# Patient Record
Sex: MALE | Race: WHITE | NOT HISPANIC OR LATINO | Employment: OTHER | URBAN - METROPOLITAN AREA
[De-identification: names, ages, dates, MRNs, and addresses within clinical notes are randomized per-mention and may not be internally consistent; named-entity substitution may affect disease eponyms.]

---

## 2023-04-12 ENCOUNTER — NURSING HOME VISIT (OUTPATIENT)
Dept: POST ACUTE CARE | Facility: EXTERNAL LOCATION | Age: 68
End: 2023-04-12
Payer: COMMERCIAL

## 2023-04-12 DIAGNOSIS — R35.0 BENIGN PROSTATIC HYPERPLASIA WITH URINARY FREQUENCY: ICD-10-CM

## 2023-04-12 DIAGNOSIS — E78.2 HYPERLIPEMIA, MIXED: ICD-10-CM

## 2023-04-12 DIAGNOSIS — K21.9 GERD WITHOUT ESOPHAGITIS: ICD-10-CM

## 2023-04-12 DIAGNOSIS — G35 MULTIPLE SCLEROSIS (MULTI): Primary | ICD-10-CM

## 2023-04-12 DIAGNOSIS — F02.B11: ICD-10-CM

## 2023-04-12 DIAGNOSIS — N40.1 BENIGN PROSTATIC HYPERPLASIA WITH URINARY FREQUENCY: ICD-10-CM

## 2023-04-12 DIAGNOSIS — I10 BENIGN ESSENTIAL HYPERTENSION: ICD-10-CM

## 2023-04-12 DIAGNOSIS — G31.09: ICD-10-CM

## 2023-04-12 DIAGNOSIS — L89.152 PRESSURE ULCER OF COCCYGEAL REGION, STAGE 2 (MULTI): ICD-10-CM

## 2023-04-12 PROBLEM — F03.B11 MODERATE DEMENTIA WITH AGITATION (MULTI): Status: ACTIVE | Noted: 2023-04-12

## 2023-04-12 PROCEDURE — 99309 SBSQ NF CARE MODERATE MDM 30: CPT | Performed by: INTERNAL MEDICINE

## 2023-04-12 NOTE — LETTER
Patient: Arnoldo Pearl  : 1955    Encounter Date: 2023    Patient ID: Arnoldo Pearl is a 67 y.o. male who presents for No chief complaint on file..    Assessment/Plan    Problem List Items Addressed This Visit          Nervous    Multiple sclerosis (CMS/HCC) - Primary     PHQ Mini-Mental neuro check intermittent steroid and [facility] or clinic evaluation         Moderate dementia with agitation (CMS/HCC)     Dementia is chronic neurodegenerative disorder where there is not cure and it can bring substantial burden to pt and family and caregivers.There are choices available for treatment of dementia and careful monitoring and follow up by us can help to reduce caregiver burden and keep pt homebound along with family members Dementia does not have sure and it can bring morbidity and mortality eventually. Mental or mind exercises, mindfulness, keeping active and cheerful, proper and well balanced diet and water consumption and brisk walks can be beneficial. Lots of studies are ongoing for dementia treatment. Forgetfulness, agitation, stubbornness, depression, insomnia, wandering are not uncommon. There are organizations , societies and support groups available for dementia related disorders.             Circulatory    Benign essential hypertension     Patients BP readings reviewed and addressed, as we age our arteries turn stiffer and less elastic. Restricting salt consumption and staying physically fit with regular exercise regimen is the only way to keep our vasculature less tonic. Studies have shown that keeping ideal body wt, exercise routine about 140 to 150 minutes a week, eating variety of plant based diet and drinking plentiful water are quite helpful. Monitor BP twice or once a week at home and bring log to be reviewed by me. Uncontrolled BP has long term consequences including heart failure, myocardial infarction, accelerated atherosclerosis and kidney dysfunction. Therapy reviewed and explained.               Digestive    GERD without esophagitis       Other    Hyperlipemia, mixed    Pressure ulcer of coccygeal region, stage 2 (CMS/HCC)     Nutrition and psych evaluation         Benign prostatic hyperplasia with urinary frequency     This patient was seen for my regular monthly visit, nursing evaluations and nursing notes were reviewed, interim events are reviewed, interim concerns and messages were reviewed as we have communicated with nursing staff.  Any issues with the falls, skin care impairment, declining physical condition are reviewed and noted, diagnosis list were reviewed, list of medications were reviewed, living will related issues were reviewed, overall patient has been doing well, any declining in patient's condition or any change in patient's condition needs to be notified to physician promptly, discussed with nursing staff, if needed would communicate with family.  Patient stays confined here at the facility for long-term care, there are always concerns about long-term care related issues and concerns.  Nursing staff is trying their best to keep patient safe, all sort of measures has been taken to keep patient safe and comfortable.   Source of history: Nurse, Medical personnel, Medical record, Patient.  History limitation: None.      HPI  67-year-old patient with advanced multiple sclerosis evaluated for the tingling numbness and pressure wound advised wound care and neurology evaluation    Comorbid condition advanced multiple sclerosis with anxiety depressive dementia anemia neuromuscular disorder bladder bowel dysfunction right lower extremity and left lower extremity pressure ulcer stage II stage III venous insufficiency BPH history of alcohol and smoking gastritis      Medications  Order Order Status Revised Last Ordered    Tylenol Tablet 325 MG (Acetaminophen) Active 7/5/2022 10/28/2020    Baclofen Tablet 20 MG Active 1/28/2023 1/28/2023  This order is potentially duplicated by other orders on  the chart. Click to view details.    Ketoconazole Shampoo 2 % Active 8/9/2020 8/8/2020  This order is potentially duplicated by other orders on the chart. Click to view details.    Ketoconazole Cream 2 % Active 8/27/2022 8/27/2022    Flomax Capsule 0.4 MG (Tamsulosin HCl) Active 4/10/2023 4/10/2023    Protonix Tablet Delayed Release 40 MG (Pantoprazole Sodium) Active 3/31/2023 3/31/2023    Calcium Carbonate Tablet 1250 (500 Ca) MG Active 7/7/2022     Multivitamin-Minerals Tablet (Multiple Vitamins-Minerals) Active 7/7/2022     Aspirin EC Low Dose Tablet Delayed Release 81 MG (Aspirin) Active 12/18/2022     Lipitor Tablet 20 MG (Atorvastatin Calcium) Active 3/3/2023 3/3/2023    traZODone HCl Tablet 150 MG Active 4/4/2023 4/4/2023    Sertraline HCl Tablet 100 MG Active 2/23/2023 2/22/2023    LaMICtal Oral Tablet 25 MG (Lamotrigine) Active 3/22/2023 3/21/2023        Objective    Current Vitals     BP: 103/67 mmHg  8/7/2022 06:29  Temp:98.1 °F  10/10/2022 15:04  Pulse:67 bpm  8/7/2022 06:29  Weight:223.2 Lbs  4/1/2023 18:07  Resp:18 Breaths/min  10/6/2022 23:12  BS:  O2:97 %  10/6/2022 23:12  Pain:0  12/16/2022 12:01      PHYSICAL EXAM  General: Lethargic HEENT: PERRLA. EOMI. MMM. Nares patent bl.  Cardiovascular: Heart murmur  Respiratory: Rhonchi  GI: Soft, NT abdomen. BS present x 4.   : No CVAT BL  MSK: ROM x 4. CTLS non-tender.   Extremities: No edema. Cap refill < 2 sec.   Skin: Stage II pressure ulcer lower extremity  Neuro: Myopathy neuropathy  Psych: Depression      ROS  Constitutional: Denies fevers, chills, fatigue, weight loss/gain  HEENT: Denies HA, vision changes, hearing loss, sore throat  Cardiac: Denies CP, palpitations, edema          There is no immunization history on file for this patient.    No visits with results within 4 Month(s) from this visit.   Latest known visit with results is:   [facility] Encounter on 12/09/2019   Component Date Value Ref Range Status   • WBC 12/09/2019 11.7 (H)  4.4 - 11.3  x10E9/L Final   • nRBC 12/09/2019 0.0  0.0 - 0.0 /100 WBC Final   • RBC 12/09/2019 5.87  4.50 - 5.90 x10E12/L Final   • Hemoglobin 12/09/2019 17.0  13.5 - 17.5 g/dL Final   • Hematocrit 12/09/2019 51.6  41.0 - 52.0 % Final   • MCV 12/09/2019 88  80 - 100 fL Final   • MCHC 12/09/2019 32.9  32.0 - 36.0 g/dL Final   • Platelets 12/09/2019 317  150 - 450 x10E9/L Final   • RDW 12/09/2019 16.5 (H)  11.5 - 14.5 % Final   • Glucose 12/09/2019 108 (H)  74 - 99 mg/dL Final   • Sodium 12/09/2019 137  136 - 145 mmol/L Final   • Potassium 12/09/2019 4.2  3.5 - 5.3 mmol/L Final   • Chloride 12/09/2019 101  98 - 107 mmol/L Final   • Bicarbonate 12/09/2019 24  21 - 32 mmol/L Final   • Anion Gap 12/09/2019 16  10 - 20 mmol/L Final   • Urea Nitrogen 12/09/2019 30 (H)  6 - 23 mg/dL Final   • Creatinine 12/09/2019 1.04  0.50 - 1.30 mg/dL Final   • GLOMERULAR FILTRATION RATE-NON AFR* 12/09/2019 >60  >60 mL/min/1.73m2 Final   • GLOMERULAR FILTRATION RATE-* 12/09/2019 >60  >60 mL/min/1.73m2 Final   • Calcium 12/09/2019 9.0  8.6 - 10.3 mg/dL Final       Radiology: Reviewed imaging in powerchart.  No results found.    No family history on file.  Social History     Socioeconomic History   • Marital status: Single     Spouse name: None   • Number of children: None   • Years of education: None   • Highest education level: None   Occupational History   • None   Tobacco Use   • Smoking status: Former     Types: Cigarettes   • Smokeless tobacco: Never   Vaping Use   • Vaping status: None   Substance and Sexual Activity   • Alcohol use: Not Currently   • Drug use: Not Currently   • Sexual activity: None   Other Topics Concern   • None   Social History Narrative   • None     Social Determinants of Health     Financial Resource Strain: Not on file   Food Insecurity: Not on file   Transportation Needs: Not on file   Physical Activity: Not on file   Stress: Not on file   Social Connections: Not on file   Intimate Partner Violence: Not on file    Housing Stability: Not on file     No past medical history on file.  No past surgical history on file.  * Cannot find OR log *    Charting was completed using voice recognition technology and may include unintended errors.           Electronically Signed By: Dennys Dean MD   4/12/23  9:29 PM

## 2023-04-13 NOTE — ASSESSMENT & PLAN NOTE
Dementia is chronic neurodegenerative disorder where there is not cure and it can bring substantial burden to pt and family and caregivers.There are choices available for treatment of dementia and careful monitoring and follow up by us can help to reduce caregiver burden and keep pt homebound along with family members Dementia does not have sure and it can bring morbidity and mortality eventually. Mental or mind exercises, mindfulness, keeping active and cheerful, proper and well balanced diet and water consumption and brisk walks can be beneficial. Lots of studies are ongoing for dementia treatment. Forgetfulness, agitation, stubbornness, depression, insomnia, wandering are not uncommon. There are organizations , societies and support groups available for dementia related disorders.

## 2023-04-13 NOTE — PROGRESS NOTES
Patient ID: Arnoldo Pearl is a 67 y.o. male who presents for No chief complaint on file..    Assessment/Plan     Problem List Items Addressed This Visit          Nervous    Multiple sclerosis (CMS/HCC) - Primary     PHQ Mini-Mental neuro check intermittent steroid and [facility] or clinic evaluation         Moderate dementia with agitation (CMS/HCC)     Dementia is chronic neurodegenerative disorder where there is not cure and it can bring substantial burden to pt and family and caregivers.There are choices available for treatment of dementia and careful monitoring and follow up by us can help to reduce caregiver burden and keep pt homebound along with family members Dementia does not have sure and it can bring morbidity and mortality eventually. Mental or mind exercises, mindfulness, keeping active and cheerful, proper and well balanced diet and water consumption and brisk walks can be beneficial. Lots of studies are ongoing for dementia treatment. Forgetfulness, agitation, stubbornness, depression, insomnia, wandering are not uncommon. There are organizations , societies and support groups available for dementia related disorders.             Circulatory    Benign essential hypertension     Patients BP readings reviewed and addressed, as we age our arteries turn stiffer and less elastic. Restricting salt consumption and staying physically fit with regular exercise regimen is the only way to keep our vasculature less tonic. Studies have shown that keeping ideal body wt, exercise routine about 140 to 150 minutes a week, eating variety of plant based diet and drinking plentiful water are quite helpful. Monitor BP twice or once a week at home and bring log to be reviewed by me. Uncontrolled BP has long term consequences including heart failure, myocardial infarction, accelerated atherosclerosis and kidney dysfunction. Therapy reviewed and explained.              Digestive    GERD without esophagitis       Other     Hyperlipemia, mixed    Pressure ulcer of coccygeal region, stage 2 (CMS/HCC)     Nutrition and psych evaluation         Benign prostatic hyperplasia with urinary frequency     This patient was seen for my regular monthly visit, nursing evaluations and nursing notes were reviewed, interim events are reviewed, interim concerns and messages were reviewed as we have communicated with nursing staff.  Any issues with the falls, skin care impairment, declining physical condition are reviewed and noted, diagnosis list were reviewed, list of medications were reviewed, living will related issues were reviewed, overall patient has been doing well, any declining in patient's condition or any change in patient's condition needs to be notified to physician promptly, discussed with nursing staff, if needed would communicate with family.  Patient stays confined here at the facility for long-term care, there are always concerns about long-term care related issues and concerns.  Nursing staff is trying their best to keep patient safe, all sort of measures has been taken to keep patient safe and comfortable.   Source of history: Nurse, Medical personnel, Medical record, Patient.  History limitation: None.      HPI  67-year-old patient with advanced multiple sclerosis evaluated for the tingling numbness and pressure wound advised wound care and neurology evaluation    Comorbid condition advanced multiple sclerosis with anxiety depressive dementia anemia neuromuscular disorder bladder bowel dysfunction right lower extremity and left lower extremity pressure ulcer stage II stage III venous insufficiency BPH history of alcohol and smoking gastritis      Medications  Order Order Status Revised Last Ordered    Tylenol Tablet 325 MG (Acetaminophen) Active 7/5/2022 10/28/2020    Baclofen Tablet 20 MG Active 1/28/2023 1/28/2023  This order is potentially duplicated by other orders on the chart. Click to view details.    Ketoconazole Shampoo 2  % Active 8/9/2020 8/8/2020  This order is potentially duplicated by other orders on the chart. Click to view details.    Ketoconazole Cream 2 % Active 8/27/2022 8/27/2022    Flomax Capsule 0.4 MG (Tamsulosin HCl) Active 4/10/2023 4/10/2023    Protonix Tablet Delayed Release 40 MG (Pantoprazole Sodium) Active 3/31/2023 3/31/2023    Calcium Carbonate Tablet 1250 (500 Ca) MG Active 7/7/2022     Multivitamin-Minerals Tablet (Multiple Vitamins-Minerals) Active 7/7/2022     Aspirin EC Low Dose Tablet Delayed Release 81 MG (Aspirin) Active 12/18/2022     Lipitor Tablet 20 MG (Atorvastatin Calcium) Active 3/3/2023 3/3/2023    traZODone HCl Tablet 150 MG Active 4/4/2023 4/4/2023    Sertraline HCl Tablet 100 MG Active 2/23/2023 2/22/2023    LaMICtal Oral Tablet 25 MG (Lamotrigine) Active 3/22/2023 3/21/2023        Objective     Current Vitals     BP: 103/67 mmHg  8/7/2022 06:29  Temp:98.1 °F  10/10/2022 15:04  Pulse:67 bpm  8/7/2022 06:29  Weight:223.2 Lbs  4/1/2023 18:07  Resp:18 Breaths/min  10/6/2022 23:12  BS:  O2:97 %  10/6/2022 23:12  Pain:0  12/16/2022 12:01      PHYSICAL EXAM  General: Lethargic HEENT: PERRLA. EOMI. MMM. Nares patent bl.  Cardiovascular: Heart murmur  Respiratory: Rhonchi  GI: Soft, NT abdomen. BS present x 4.   : No CVAT BL  MSK: ROM x 4. CTLS non-tender.   Extremities: No edema. Cap refill < 2 sec.   Skin: Stage II pressure ulcer lower extremity  Neuro: Myopathy neuropathy  Psych: Depression      ROS  Constitutional: Denies fevers, chills, fatigue, weight loss/gain  HEENT: Denies HA, vision changes, hearing loss, sore throat  Cardiac: Denies CP, palpitations, edema          There is no immunization history on file for this patient.    No visits with results within 4 Month(s) from this visit.   Latest known visit with results is:   [facility] Encounter on 12/09/2019   Component Date Value Ref Range Status    WBC 12/09/2019 11.7 (H)  4.4 - 11.3 x10E9/L Final    nRBC 12/09/2019 0.0  0.0 - 0.0 /100 WBC  Final    RBC 12/09/2019 5.87  4.50 - 5.90 x10E12/L Final    Hemoglobin 12/09/2019 17.0  13.5 - 17.5 g/dL Final    Hematocrit 12/09/2019 51.6  41.0 - 52.0 % Final    MCV 12/09/2019 88  80 - 100 fL Final    MCHC 12/09/2019 32.9  32.0 - 36.0 g/dL Final    Platelets 12/09/2019 317  150 - 450 x10E9/L Final    RDW 12/09/2019 16.5 (H)  11.5 - 14.5 % Final    Glucose 12/09/2019 108 (H)  74 - 99 mg/dL Final    Sodium 12/09/2019 137  136 - 145 mmol/L Final    Potassium 12/09/2019 4.2  3.5 - 5.3 mmol/L Final    Chloride 12/09/2019 101  98 - 107 mmol/L Final    Bicarbonate 12/09/2019 24  21 - 32 mmol/L Final    Anion Gap 12/09/2019 16  10 - 20 mmol/L Final    Urea Nitrogen 12/09/2019 30 (H)  6 - 23 mg/dL Final    Creatinine 12/09/2019 1.04  0.50 - 1.30 mg/dL Final    GLOMERULAR FILTRATION RATE-NON AFR* 12/09/2019 >60  >60 mL/min/1.73m2 Final    GLOMERULAR FILTRATION RATE-* 12/09/2019 >60  >60 mL/min/1.73m2 Final    Calcium 12/09/2019 9.0  8.6 - 10.3 mg/dL Final       Radiology: Reviewed imaging in powerchart.  No results found.    No family history on file.  Social History     Socioeconomic History    Marital status: Single     Spouse name: None    Number of children: None    Years of education: None    Highest education level: None   Occupational History    None   Tobacco Use    Smoking status: Former     Types: Cigarettes    Smokeless tobacco: Never   Vaping Use    Vaping status: None   Substance and Sexual Activity    Alcohol use: Not Currently    Drug use: Not Currently    Sexual activity: None   Other Topics Concern    None   Social History Narrative    None     Social Determinants of Health     Financial Resource Strain: Not on file   Food Insecurity: Not on file   Transportation Needs: Not on file   Physical Activity: Not on file   Stress: Not on file   Social Connections: Not on file   Intimate Partner Violence: Not on file   Housing Stability: Not on file     No past medical history on file.  No past surgical  history on file.  * Cannot find OR log *    Charting was completed using voice recognition technology and may include unintended errors.

## 2023-07-08 ENCOUNTER — NURSING HOME VISIT (OUTPATIENT)
Dept: POST ACUTE CARE | Facility: EXTERNAL LOCATION | Age: 68
End: 2023-07-08
Payer: COMMERCIAL

## 2023-07-08 DIAGNOSIS — E87.5 HYPERKALEMIA: Primary | ICD-10-CM

## 2023-07-08 DIAGNOSIS — D75.839 THROMBOCYTOSIS: ICD-10-CM

## 2023-07-08 DIAGNOSIS — D50.0 IRON DEFICIENCY ANEMIA DUE TO CHRONIC BLOOD LOSS: ICD-10-CM

## 2023-07-08 DIAGNOSIS — I10 BENIGN ESSENTIAL HYPERTENSION: ICD-10-CM

## 2023-07-08 DIAGNOSIS — L89.152 PRESSURE ULCER OF COCCYGEAL REGION, STAGE 2 (MULTI): ICD-10-CM

## 2023-07-08 DIAGNOSIS — G35 MULTIPLE SCLEROSIS (MULTI): ICD-10-CM

## 2023-07-08 PROCEDURE — 99309 SBSQ NF CARE MODERATE MDM 30: CPT | Performed by: INTERNAL MEDICINE

## 2023-07-08 NOTE — LETTER
Patient: Arnoldo Pearl  : 1955    Encounter Date: 2023    Name: Arnoldo Pearl  YOB: 1955    FOLLOW UP VISIT:   Allergies: No Known Allergies   Code Status: [facility]    Special Instructions:    Diet: Regular diet, Regular texture, Thin liquids consistency  Diagnosis: MULTIPLE SCLEROSIS   SUBJECTIVE:WBC 10.7 RBCs 3.7 hemoglobin 9 and hematocrit 32 platelets 632 sodium 134 potassium 6.5 glucose 58 calcium 7.9G OT 45 prealbumin 11.4    Iron B12 folic acid supplement aspirin complex carbohydrate diet recently had a nonhealing wound given Bactrim patient is a hospice no potassium supplement repeat potassium level again    REVIEW OF SYSTEMS:   All review of systems are negative unless otherwise stated above under subjective.    LABS REVIEWED AT FACILITY:    MEDICATIONS REVIEWED AT FACILITY:     Tylenol Tablet 325 MG (Acetaminophen) Active 2022 10/28/2020    Baclofen Tablet 20 MG Active 2023  This order is potentially duplicated by other orders on the chart. Click to view details.    Ketoconazole Shampoo 2 % Active 2020  This order is potentially duplicated by other orders on the chart. Click to view details.    Ketoconazole Cream 2 % Active 2023    Flomax Capsule 0.4 MG (Tamsulosin HCl) Active 2023    Protonix Tablet Delayed Release 40 MG (Pantoprazole Sodium) Active 2023    Calcium Carbonate Tablet 1250 (500 Ca) MG Active 2022     Multivitamin-Minerals Tablet (Multiple Vitamins-Minerals) Active 2022     Aspirin EC Low Dose Tablet Delayed Release 81 MG (Aspirin) Active 2022     Lipitor Tablet 20 MG (Atorvastatin Calcium) Active 2023    traZODone HCl Tablet 150 MG Active 2023    Zoloft Oral Tablet 100 MG (Sertraline HCl) Active 2023    LaMICtal Oral Tablet 100 MG (Lamotrigine) Active 2023    Bactrim DS Oral Tablet 800-160 MG  (Sulfamethoxazole-Trimethoprim) Active 7/22/2023 7/22/2023  Living will related issues reviewed-Code status:     OBJECTIVE:G35 MULTIPLE SCLEROSIS  8/1/2019    F33.1 MAJOR DEPRESSIVE DISORDER, RECURRENT, MODERATE  8/1/2019    F29 UNSP PSYCHOSIS NOT DUE TO A SUBSTANCE OR KNOWN PHYSIOL COND  8/1/2019    R45.851 SUICIDAL IDEATIONS  8/1/2019    G47.00 INSOMNIA, UNSPECIFIED  8/1/2019    D64.9 ANEMIA, UNSPECIFIED  8/1/2019    N31.9 NEUROMUSCULAR DYSFUNCTION OF BLADDER, UNSPECIFIED  10/20/2022    I87.323 CHRONIC VENOUS HTN W INFLAMMATION OF BILATERAL LOW EXTRM  10/20/2022    I87.313 CHRONIC VENOUS HYPERTENSION W ULCER OF BILATERAL LOW EXTRM  10/20/2022    I73.9 PERIPHERAL VASCULAR DISEASE, UNSPECIFIED  10/20/2022    L97.811 NON-PRS CHR ULCER OTH PRT R LOW LEG LIMITED TO BRKDWN SKIN  10/20/2022    L97.821 NON-PRS CHR ULCER OTH PRT L LOW LEG LIMITED TO BRKDWN SKIN  10/1/2022    F03.918 UNSP DEMENTIA, UNSP SEVERITY, WITH OTHER BEHAVIORAL DISTURB  4/6/2022    I87.312 CHRONIC VENOUS HYPERTENSION W ULCER OF L LOW EXTREM  4/6/2022    I87.311 CHRONIC VENOUS HYPERTENSION W ULCER OF R LOW EXTREM  2/24/2022    I87.2 VENOUS INSUFFICIENCY (CHRONIC) (PERIPHERAL  2/1/2022    R29.3 ABNORMAL POSTURE  12/10/2021    F22 DELUSIONAL DISORDERS  9/14/2021    M62.81 MUSCLE WEAKNESS (GENERALIZED  3/1/2021    N40.0 BENIGN PROSTATIC HYPERPLASIA WITHOUT LOWER URINRY TRACT SYMP  1/1/2021    Z86.16 PERSONAL HISTORY OF COVID-19  6/16/2020    Z87.440 PERSONAL HISTORY OF URINARY (TRACT) INFECTIONS  6/16/2020    R39.81 FUNCTIONAL URINARY INCONTINENCE  12/14/2019    K21.9 GASTRO-ESOPHAGEAL REFLUX DISEASE WITHOUT ESOPHAGITIS  8/1/2019    E78.5 HYPERLIPIDEMIA, UNSPECIFIED  8/1/2019    F10.21 ALCOHOL DEPENDENCE, IN REMISSION  There were no vitals taken for this visit.  Physical Exam  Current Vitals     BP: 103/67 mmHg  8/7/2022 06:29  Temp:98.1 °F  10/10/2022 15:04  Pulse:67 bpm  8/7/2022 06:29    Weight:217 Lbs  7/3/2023 17:30  Resp:18 Breaths/min  10/6/2022  23:12  BS:  O2:97 %  10/6/2022 23:12  Pain:0  7/22/2023 07:55  General: Lethargic HEENT: PERRLA. EOMI. MMM. Nares patent bl.  Cardiovascular: Heart murmur  Respiratory: Rhonchi  GI: Soft, NT abdomen. BS present x 4.   : No CVAT BL  MSK: ROM x 4. CTLS non-tender.   Extremities: No edema. Cap refill < 2 sec.   Skin: Stage II pressure ulcer lower extremity  Neuro: Myopathy neuropathy  Psych: Depression  Assessment/Plan  Problem List Items Addressed This Visit          Cardiac and Vasculature    Benign essential hypertension     Patients BP readings reviewed and addressed, as we age our arteries turn stiffer and less elastic. Restricting salt consumption and staying physically fit with regular exercise regimen is the only way to keep our vasculature less tonic. Studies have shown that keeping ideal body wt, exercise routine about 140 to 150 minutes a week, eating variety of plant based diet and drinking plentiful water are quite helpful. Monitor BP twice or once a week at home and bring log to be reviewed by me. Uncontrolled BP has long term consequences including heart failure, myocardial infarction, accelerated atherosclerosis and kidney dysfunction. Therapy reviewed and explained.              Genitourinary and Reproductive    Hyperkalemia - Primary     Repeat potassium calcium magnesium given albuterol insulin and Lokelma            Hematology and Neoplasia    Iron deficiency anemia due to chronic blood loss    Thrombocytosis (CMS/HCC)     Repeat CBC with differential  Hydration aspirin            Musculoskeletal and Injuries    Pressure ulcer of coccygeal region, stage 2 (CMS/HCC)       Neuro    Multiple sclerosis (CMS/HCC)     Discussed with hospice symptom management          This patient was seen for my regular monthly visit, nursing evaluations and nursing notes were reviewed, interim events are reviewed, interim concerns and messages were reviewed as we have communicated with nursing staff.  Any issues with  the falls, skin care impairment, declining physical condition are reviewed and noted, diagnosis list were reviewed, list of medications were reviewed, living will related issues were reviewed, overall patient has been doing well, any declining in patient's condition or any change in patient's condition needs to be notified to physician promptly, discussed with nursing staff, if needed would communicate with family.  Patient stays confined here at the facility for long-term care, there are always concerns about long-term care related issues and concerns.  Nursing staff is trying their best to keep patient safe, all sort of measures has been taken to keep patient safe and comfortable.   Skin integrity:  Nursing to monitor skin integrity as patient is at risk for pressure injuries.  Wound care per nursing    See Facility notes for measurements/assessments  Turn and reposition Q 2 hours or more  Air mattress and when up in chair cushion reducing device  Dietician to evaluate and recommend:  Nutritional supplement:  Please monitor skin integrity and other pressure areas  Referral to wound clinic if appropriate:    PLAN:  Pt has been seen for follow up visit.  The patient is here at facility for PT/OT/ST to maximize strength, function, endurance and safety.  The patient is participating in therapy. Arnoldo Pearl is making progress to the best of his/her ability.   Please see PT/OT/ST notes in the facility for detailed information regarding progression of patients progress.   Recent nursing evaluation and notes were reviewed.   Overall, patient is stable despite his/her chronic conditions.    #  Any decline or change in condition needs to be communicated with the physician or myself.    Discussion with nursing staff regarding ongoing care and management.  If needed, would communicate with family who are not present at this time.   There are no concerns at this time.  We will continue with the medications noted above.    We  will continue to follow the patient here at the facility.    Discharge planning:   Discharge Home when goals are met.   Follow up with PCP in 1-2 weeks after discharge from facility.   HHC to follow after discharge for continued PT/OT and Nursing.    *Please note that nursing facility, outside laboratory agency, and  [facility] do not interface.     Completion of the note was done through Dragon voice recognition technology and may include   unintended or grammatical errors which may not have been recognized when finalizing the note.     Time:    Dennys Dean MD         Electronically Signed By: Dennys Dean MD   7/22/23  4:23 PM

## 2023-07-22 PROBLEM — D75.839 THROMBOCYTOSIS: Status: ACTIVE | Noted: 2023-07-22

## 2023-07-22 PROBLEM — E87.5 HYPERKALEMIA: Status: ACTIVE | Noted: 2023-07-22

## 2023-07-22 PROBLEM — D50.0 IRON DEFICIENCY ANEMIA DUE TO CHRONIC BLOOD LOSS: Status: ACTIVE | Noted: 2023-07-22

## 2023-07-22 NOTE — PROGRESS NOTES
Name: Arnoldo Pearl  YOB: 1955    FOLLOW UP VISIT:   Allergies: No Known Allergies   Code Status: [facility]    Special Instructions:    Diet: Regular diet, Regular texture, Thin liquids consistency  Diagnosis: MULTIPLE SCLEROSIS   SUBJECTIVE:WBC 10.7 RBCs 3.7 hemoglobin 9 and hematocrit 32 platelets 632 sodium 134 potassium 6.5 glucose 58 calcium 7.9G OT 45 prealbumin 11.4    Iron B12 folic acid supplement aspirin complex carbohydrate diet recently had a nonhealing wound given Bactrim patient is a hospice no potassium supplement repeat potassium level again    REVIEW OF SYSTEMS:   All review of systems are negative unless otherwise stated above under subjective.    LABS REVIEWED AT FACILITY:    MEDICATIONS REVIEWED AT FACILITY:     Tylenol Tablet 325 MG (Acetaminophen) Active 7/5/2022 10/28/2020    Baclofen Tablet 20 MG Active 6/6/2023 6/6/2023  This order is potentially duplicated by other orders on the chart. Click to view details.    Ketoconazole Shampoo 2 % Active 8/9/2020 8/8/2020  This order is potentially duplicated by other orders on the chart. Click to view details.    Ketoconazole Cream 2 % Active 6/6/2023 6/6/2023    Flomax Capsule 0.4 MG (Tamsulosin HCl) Active 6/14/2023 6/14/2023    Protonix Tablet Delayed Release 40 MG (Pantoprazole Sodium) Active 6/30/2023 6/30/2023    Calcium Carbonate Tablet 1250 (500 Ca) MG Active 7/7/2022     Multivitamin-Minerals Tablet (Multiple Vitamins-Minerals) Active 7/7/2022     Aspirin EC Low Dose Tablet Delayed Release 81 MG (Aspirin) Active 12/18/2022     Lipitor Tablet 20 MG (Atorvastatin Calcium) Active 6/17/2023 6/17/2023    traZODone HCl Tablet 150 MG Active 6/29/2023 6/29/2023    Zoloft Oral Tablet 100 MG (Sertraline HCl) Active 6/18/2023 6/18/2023    LaMICtal Oral Tablet 100 MG (Lamotrigine) Active 7/5/2023 7/4/2023    Bactrim DS Oral Tablet 800-160 MG (Sulfamethoxazole-Trimethoprim) Active 7/22/2023 7/22/2023  Living will related issues reviewed-Code  status:     OBJECTIVE:G35 MULTIPLE SCLEROSIS  8/1/2019    F33.1 MAJOR DEPRESSIVE DISORDER, RECURRENT, MODERATE  8/1/2019    F29 UNSP PSYCHOSIS NOT DUE TO A SUBSTANCE OR KNOWN PHYSIOL COND  8/1/2019    R45.851 SUICIDAL IDEATIONS  8/1/2019    G47.00 INSOMNIA, UNSPECIFIED  8/1/2019    D64.9 ANEMIA, UNSPECIFIED  8/1/2019    N31.9 NEUROMUSCULAR DYSFUNCTION OF BLADDER, UNSPECIFIED  10/20/2022    I87.323 CHRONIC VENOUS HTN W INFLAMMATION OF BILATERAL LOW EXTRM  10/20/2022    I87.313 CHRONIC VENOUS HYPERTENSION W ULCER OF BILATERAL LOW EXTRM  10/20/2022    I73.9 PERIPHERAL VASCULAR DISEASE, UNSPECIFIED  10/20/2022    L97.811 NON-PRS CHR ULCER OTH PRT R LOW LEG LIMITED TO BRKDWN SKIN  10/20/2022    L97.821 NON-PRS CHR ULCER OTH PRT L LOW LEG LIMITED TO BRKDWN SKIN  10/1/2022    F03.918 UNSP DEMENTIA, UNSP SEVERITY, WITH OTHER BEHAVIORAL DISTURB  4/6/2022    I87.312 CHRONIC VENOUS HYPERTENSION W ULCER OF L LOW EXTREM  4/6/2022    I87.311 CHRONIC VENOUS HYPERTENSION W ULCER OF R LOW EXTREM  2/24/2022    I87.2 VENOUS INSUFFICIENCY (CHRONIC) (PERIPHERAL  2/1/2022    R29.3 ABNORMAL POSTURE  12/10/2021    F22 DELUSIONAL DISORDERS  9/14/2021    M62.81 MUSCLE WEAKNESS (GENERALIZED  3/1/2021    N40.0 BENIGN PROSTATIC HYPERPLASIA WITHOUT LOWER URINRY TRACT SYMP  1/1/2021    Z86.16 PERSONAL HISTORY OF COVID-19  6/16/2020    Z87.440 PERSONAL HISTORY OF URINARY (TRACT) INFECTIONS  6/16/2020    R39.81 FUNCTIONAL URINARY INCONTINENCE  12/14/2019    K21.9 GASTRO-ESOPHAGEAL REFLUX DISEASE WITHOUT ESOPHAGITIS  8/1/2019    E78.5 HYPERLIPIDEMIA, UNSPECIFIED  8/1/2019    F10.21 ALCOHOL DEPENDENCE, IN REMISSION  There were no vitals taken for this visit.  Physical Exam  Current Vitals     BP: 103/67 mmHg  8/7/2022 06:29  Temp:98.1 °F  10/10/2022 15:04  Pulse:67 bpm  8/7/2022 06:29    Weight:217 Lbs  7/3/2023 17:30  Resp:18 Breaths/min  10/6/2022 23:12  BS:  O2:97 %  10/6/2022 23:12  Pain:0  7/22/2023 07:55  General: Lethargic HEENT: PERRLA. EOMI.  MMM. Nares patent bl.  Cardiovascular: Heart murmur  Respiratory: Rhonchi  GI: Soft, NT abdomen. BS present x 4.   : No CVAT BL  MSK: ROM x 4. CTLS non-tender.   Extremities: No edema. Cap refill < 2 sec.   Skin: Stage II pressure ulcer lower extremity  Neuro: Myopathy neuropathy  Psych: Depression  Assessment/Plan   Problem List Items Addressed This Visit          Cardiac and Vasculature    Benign essential hypertension     Patients BP readings reviewed and addressed, as we age our arteries turn stiffer and less elastic. Restricting salt consumption and staying physically fit with regular exercise regimen is the only way to keep our vasculature less tonic. Studies have shown that keeping ideal body wt, exercise routine about 140 to 150 minutes a week, eating variety of plant based diet and drinking plentiful water are quite helpful. Monitor BP twice or once a week at home and bring log to be reviewed by me. Uncontrolled BP has long term consequences including heart failure, myocardial infarction, accelerated atherosclerosis and kidney dysfunction. Therapy reviewed and explained.              Genitourinary and Reproductive    Hyperkalemia - Primary     Repeat potassium calcium magnesium given albuterol insulin and Lokelma            Hematology and Neoplasia    Iron deficiency anemia due to chronic blood loss    Thrombocytosis (CMS/HCC)     Repeat CBC with differential  Hydration aspirin            Musculoskeletal and Injuries    Pressure ulcer of coccygeal region, stage 2 (CMS/HCC)       Neuro    Multiple sclerosis (CMS/HCC)     Discussed with hospice symptom management          This patient was seen for my regular monthly visit, nursing evaluations and nursing notes were reviewed, interim events are reviewed, interim concerns and messages were reviewed as we have communicated with nursing staff.  Any issues with the falls, skin care impairment, declining physical condition are reviewed and noted, diagnosis list  were reviewed, list of medications were reviewed, living will related issues were reviewed, overall patient has been doing well, any declining in patient's condition or any change in patient's condition needs to be notified to physician promptly, discussed with nursing staff, if needed would communicate with family.  Patient stays confined here at the facility for long-term care, there are always concerns about long-term care related issues and concerns.  Nursing staff is trying their best to keep patient safe, all sort of measures has been taken to keep patient safe and comfortable.   Skin integrity:  Nursing to monitor skin integrity as patient is at risk for pressure injuries.  Wound care per nursing    See Facility notes for measurements/assessments  Turn and reposition Q 2 hours or more  Air mattress and when up in chair cushion reducing device  Dietician to evaluate and recommend:  Nutritional supplement:  Please monitor skin integrity and other pressure areas  Referral to wound clinic if appropriate:    PLAN:  Pt has been seen for follow up visit.  The patient is here at facility for PT/OT/ST to maximize strength, function, endurance and safety.  The patient is participating in therapy. Arnoldo Pearl is making progress to the best of his/her ability.   Please see PT/OT/ST notes in the facility for detailed information regarding progression of patients progress.   Recent nursing evaluation and notes were reviewed.   Overall, patient is stable despite his/her chronic conditions.    #  Any decline or change in condition needs to be communicated with the physician or myself.    Discussion with nursing staff regarding ongoing care and management.  If needed, would communicate with family who are not present at this time.   There are no concerns at this time.  We will continue with the medications noted above.    We will continue to follow the patient here at the facility.    Discharge planning:   Discharge Home when  goals are met.   Follow up with PCP in 1-2 weeks after discharge from facility.   HHC to follow after discharge for continued PT/OT and Nursing.    *Please note that nursing facility, outside laboratory agency, and  [facility] do not interface.     Completion of the note was done through Dragon voice recognition technology and may include   unintended or grammatical errors which may not have been recognized when finalizing the note.     Time:    Dennys Dean MD

## 2023-08-23 ENCOUNTER — NURSING HOME VISIT (OUTPATIENT)
Dept: POST ACUTE CARE | Facility: EXTERNAL LOCATION | Age: 68
End: 2023-08-23
Payer: COMMERCIAL

## 2023-08-23 DIAGNOSIS — L89.152 PRESSURE ULCER OF COCCYGEAL REGION, STAGE 2 (MULTI): ICD-10-CM

## 2023-08-23 DIAGNOSIS — D50.0 IRON DEFICIENCY ANEMIA DUE TO CHRONIC BLOOD LOSS: ICD-10-CM

## 2023-08-23 DIAGNOSIS — N40.1 BENIGN PROSTATIC HYPERPLASIA WITH URINARY FREQUENCY: ICD-10-CM

## 2023-08-23 DIAGNOSIS — F10.27: ICD-10-CM

## 2023-08-23 DIAGNOSIS — K21.9 GERD WITHOUT ESOPHAGITIS: ICD-10-CM

## 2023-08-23 DIAGNOSIS — E78.2 HYPERLIPEMIA, MIXED: ICD-10-CM

## 2023-08-23 DIAGNOSIS — R35.0 BENIGN PROSTATIC HYPERPLASIA WITH URINARY FREQUENCY: ICD-10-CM

## 2023-08-23 DIAGNOSIS — I10 BENIGN ESSENTIAL HYPERTENSION: ICD-10-CM

## 2023-08-23 DIAGNOSIS — G35 MULTIPLE SCLEROSIS (MULTI): Primary | ICD-10-CM

## 2023-08-23 PROBLEM — E87.5 HYPERKALEMIA: Status: RESOLVED | Noted: 2023-07-22 | Resolved: 2023-08-23

## 2023-08-23 PROCEDURE — 99309 SBSQ NF CARE MODERATE MDM 30: CPT | Performed by: INTERNAL MEDICINE

## 2023-08-23 NOTE — PROGRESS NOTES
Name: Arnoldo Pearl  YOB: 1955    FOLLOW UP VISIT:   Allergies: No Known Allergies   Code Status: [facility]    Special Instructions:    Diet: Regular diet, Regular texture, Thin liquids consistency  Diagnosis: MULTIPLE SCLEROSIS   SUBJECTIVE:WBC   67-year-old patient of obesity multiple sclerosis anemia anxiety depression complaining arthralgia myalgia fatigue tired weakness tingling numbness nonhealing wound lower extremity  Anemia hemoglobin 11.9-39.6 urinalysis stool occult blood iron level right to left lower leg wound advised wound care evaluation anxiety depression iron B12 folic acid Zoloft  10.7 RBCs 3.7 hemoglobin 9 and hematocrit 32 platelets 632 sodium 134 potassium 6.5 glucose 58 calcium 7.9G OT 45 prealbumin 11.4    Iron B12 folic acid supplement aspirin complex carbohydrate diet recently had a nonhealing wound given Bactrim patient is a hospice no potassium supplement repeat potassium level again    REVIEW OF SYSTEMS:   All review of systems are negative unless otherwise stated above under subjective.    LABS REVIEWED AT FACILITY:    MEDICATIONS REVIEWED AT FACILITY:     Tylenol Tablet 325 MG (Acetaminophen) Active 7/5/2022 10/28/2020    Baclofen Tablet 20 MG Active 6/6/2023 6/6/2023  This order is potentially duplicated by other orders on the chart. Click to view details.    Ketoconazole Shampoo 2 % Active 8/9/2020 8/8/2020  This order is potentially duplicated by other orders on the chart. Click to view details.    Ketoconazole Cream 2 % Active 6/6/2023 6/6/2023    Flomax Capsule 0.4 MG (Tamsulosin HCl) Active 6/14/2023 6/14/2023    Protonix Tablet Delayed Release 40 MG (Pantoprazole Sodium) Active 6/30/2023 6/30/2023    Calcium Carbonate Tablet 1250 (500 Ca) MG Active 7/7/2022     Multivitamin-Minerals Tablet (Multiple Vitamins-Minerals) Active 7/7/2022     Aspirin EC Low Dose Tablet Delayed Release 81 MG (Aspirin) Active 12/18/2022     Lipitor Tablet 20 MG (Atorvastatin  Calcium) Active 6/17/2023 6/17/2023    traZODone HCl Tablet 150 MG Active 6/29/2023 6/29/2023    Zoloft Oral Tablet 100 MG (Sertraline HCl) Active 6/18/2023 6/18/2023    LaMICtal Oral Tablet 100 MG (Lamotrigine) Active 7/5/2023 7/4/2023    Bactrim DS Oral Tablet 800-160 MG (Sulfamethoxazole-Trimethoprim) Active 7/22/2023 7/22/2023  Living will related issues reviewed-Code status:     OBJECTIVE:G35 MULTIPLE SCLEROSIS  8/1/2019    F33.1 MAJOR DEPRESSIVE DISORDER, RECURRENT, MODERATE  8/1/2019    F29 UNSP PSYCHOSIS NOT DUE TO A SUBSTANCE OR KNOWN PHYSIOL COND  8/1/2019    R45.851 SUICIDAL IDEATIONS  8/1/2019    G47.00 INSOMNIA, UNSPECIFIED  8/1/2019    D64.9 ANEMIA, UNSPECIFIED  8/1/2019    N31.9 NEUROMUSCULAR DYSFUNCTION OF BLADDER, UNSPECIFIED  10/20/2022    I87.323 CHRONIC VENOUS HTN W INFLAMMATION OF BILATERAL LOW EXTRM  10/20/2022    I87.313 CHRONIC VENOUS HYPERTENSION W ULCER OF BILATERAL LOW EXTRM  10/20/2022    I73.9 PERIPHERAL VASCULAR DISEASE, UNSPECIFIED  10/20/2022    L97.811 NON-PRS CHR ULCER OTH PRT R LOW LEG LIMITED TO BRKDWN SKIN  10/20/2022    L97.821 NON-PRS CHR ULCER OTH PRT L LOW LEG LIMITED TO BRKDWN SKIN  10/1/2022    F03.918 UNSP DEMENTIA, UNSP SEVERITY, WITH OTHER BEHAVIORAL DISTURB  4/6/2022    I87.312 CHRONIC VENOUS HYPERTENSION W ULCER OF L LOW EXTREM  4/6/2022    I87.311 CHRONIC VENOUS HYPERTENSION W ULCER OF R LOW EXTREM  2/24/2022    I87.2 VENOUS INSUFFICIENCY (CHRONIC) (PERIPHERAL  2/1/2022    R29.3 ABNORMAL POSTURE  12/10/2021    F22 DELUSIONAL DISORDERS  9/14/2021    M62.81 MUSCLE WEAKNESS (GENERALIZED  3/1/2021    N40.0 BENIGN PROSTATIC HYPERPLASIA WITHOUT LOWER URINRY TRACT SYMP  1/1/2021    Z86.16 PERSONAL HISTORY OF COVID-19  6/16/2020    Z87.440 PERSONAL HISTORY OF URINARY (TRACT) INFECTIONS  6/16/2020    R39.81 FUNCTIONAL URINARY INCONTINENCE  12/14/2019    K21.9 GASTRO-ESOPHAGEAL REFLUX DISEASE WITHOUT ESOPHAGITIS  8/1/2019    E78.5 HYPERLIPIDEMIA, UNSPECIFIED  8/1/2019     F10.21 ALCOHOL DEPENDENCE, IN REMISSION  There were no vitals taken for this visit.  Physical Exam    Current Vitals     BP: 103/67 mmHg  8/7/2022 06:29  Temp:98.1 °F  10/10/2022 15:04  Pulse:67 bpm  8/7/2022 06:29    Weight:217 Lbs  7/3/2023 17:30  Resp:18 Breaths/min  10/6/2022 23:12  BS:  O2:97 %  10/6/2022 23:12  Pain:0  8/23/2023 10:09  Current Vitals     BP: 103/67 mmHg  8/7/2022 06:29  Temp:98.1 °F  10/10/2022 15:04  Pulse:67 bpm  8/7/2022 06:29      General: Lethargic HEENT: PERRLA. EOMI. MMM. Nares patent bl.  Cardiovascular: Heart murmur  Respiratory: Rhonchi  GI: Soft, NT abdomen. BS present x 4.   : No CVAT BL  MSK: ROM x 4. CTLS non-tender.   Extremities: No edema. Cap refill < 2 sec.   Skin: Stage II pressure ulcer lower extremity  Neuro: Myopathy neuropathy  Psych: Depression  Assessment/Plan   Problem List Items Addressed This Visit       Multiple sclerosis (CMS/HCC) - Primary    GERD without esophagitis    Moderate dementia with agitation (CMS/HCC)     Dementia is chronic neurodegenerative disorder where there is not cure and it can bring substantial burden to pt and family and caregivers.There are choices available for treatment of dementia and careful monitoring and follow up by us can help to reduce caregiver burden and keep pt homebound along with family members Dementia does not have sure and it can bring morbidity and mortality eventually. Mental or mind exercises, mindfulness, keeping active and cheerful, proper and well balanced diet and water consumption and brisk walks can be beneficial. Lots of studies are ongoing for dementia treatment. Forgetfulness, agitation, stubbornness, depression, insomnia, wandering are not uncommon. There are organizations , societies and support groups available for dementia related disorders.          Benign essential hypertension     Patients BP readings reviewed and addressed, as we age our arteries turn stiffer and less elastic. Restricting salt  consumption and staying physically fit with regular exercise regimen is the only way to keep our vasculature less tonic. Studies have shown that keeping ideal body wt, exercise routine about 140 to 150 minutes a week, eating variety of plant based diet and drinking plentiful water are quite helpful. Monitor BP twice or once a week at home and bring log to be reviewed by me. Uncontrolled BP has long term consequences including heart failure, myocardial infarction, accelerated atherosclerosis and kidney dysfunction. Therapy reviewed and explained.           Hyperlipemia, mixed    Pressure ulcer of coccygeal region, stage 2 (CMS/HCC)    Benign prostatic hyperplasia with urinary frequency    Iron deficiency anemia due to chronic blood loss   This patient was seen for my regular monthly visit, nursing evaluations and nursing notes were reviewed, interim events are reviewed, interim concerns and messages were reviewed as we have communicated with nursing staff.  Any issues with the falls, skin care impairment, declining physical condition are reviewed and noted, diagnosis list were reviewed, list of medications were reviewed, living will related issues were reviewed, overall patient has been doing well, any declining in patient's condition or any change in patient's condition needs to be notified to physician promptly, discussed with nursing staff, if needed would communicate with family.  Patient stays confined here at the facility for long-term care, there are always concerns about long-term care related issues and concerns.  Nursing staff is trying their best to keep patient safe, all sort of measures has been taken to keep patient safe and comfortable.   Skin integrity:  Nursing to monitor skin integrity as patient is at risk for pressure injuries.  Wound care per nursing    See Facility notes for measurements/assessments  Turn and reposition Q 2 hours or more  Air mattress and when up in chair cushion reducing  device  Dietician to evaluate and recommend:  Nutritional supplement:  Please monitor skin integrity and other pressure areas  Referral to wound clinic if appropriate:    PLAN:  Pt has been seen for follow up visit.  The patient is here at facility for PT/OT/ST to maximize strength, function, endurance and safety.  The patient is participating in therapy. Arnoldo Pearl is making progress to the best of his/her ability.   Please see PT/OT/ST notes in the facility for detailed information regarding progression of patients progress.   Recent nursing evaluation and notes were reviewed.   Overall, patient is stable despite his/her chronic conditions.    #  Any decline or change in condition needs to be communicated with the physician or myself.    Discussion with nursing staff regarding ongoing care and management.  If needed, would communicate with family who are not present at this time.   There are no concerns at this time.  We will continue with the medications noted above.    We will continue to follow the patient here at the facility.    Discharge planning:   Discharge Home when goals are met.   Follow up with PCP in 1-2 weeks after discharge from facility.   [facility] to follow after discharge for continued PT/OT and Nursing.    *Please note that nursing facility, outside laboratory agency, and  [facility] do not interface.     Completion of the note was done through Dragon voice recognition technology and may include   unintended or grammatical errors which may not have been recognized when finalizing the note.     Time:    Dennys Dean MD

## 2023-08-23 NOTE — LETTER
Patient: Arnoldo Pearl  : 1955    Encounter Date: 2023    Name: Arnoldo Pearl  YOB: 1955    FOLLOW UP VISIT:   Allergies: No Known Allergies   [facility]    Special Instructions:    Diet: Regular diet, Regular texture, Thin liquids consistency  Diagnosis: MULTIPLE SCLEROSIS   SUBJECTIVE:WBC   67-year-old patient of obesity multiple sclerosis anemia anxiety depression complaining arthralgia myalgia fatigue tired weakness tingling numbness nonhealing wound lower extremity  Anemia hemoglobin 11.9-39.6 urinalysis stool occult blood iron level right to left lower leg wound advised wound care evaluation anxiety depression iron B12 folic acid Zoloft  10.7 RBCs 3.7 hemoglobin 9 and hematocrit 32 platelets 632 sodium 134 potassium 6.5 glucose 58 calcium 7.9G OT 45 prealbumin 11.4    Iron B12 folic acid supplement aspirin complex carbohydrate diet recently had a nonhealing wound given Bactrim patient is a hospice no potassium supplement repeat potassium level again    REVIEW OF SYSTEMS:   All review of systems are negative unless otherwise stated above under subjective.    LABS REVIEWED AT FACILITY:    MEDICATIONS REVIEWED AT FACILITY:     Tylenol Tablet 325 MG (Acetaminophen) Active 2022 10/28/2020    Baclofen Tablet 20 MG Active 2023  This order is potentially duplicated by other orders on the chart. Click to view details.    Ketoconazole Shampoo 2 % Active 2020  This order is potentially duplicated by other orders on the chart. Click to view details.    Ketoconazole Cream 2 % Active 2023    Flomax Capsule 0.4 MG (Tamsulosin HCl) Active 2023    Protonix Tablet Delayed Release 40 MG (Pantoprazole Sodium) Active 2023    Calcium Carbonate Tablet 1250 (500 Ca) MG Active 2022     Multivitamin-Minerals Tablet (Multiple Vitamins-Minerals) Active 2022     Aspirin EC Low Dose Tablet Delayed Release 81 MG  (Aspirin) Active 12/18/2022     Lipitor Tablet 20 MG (Atorvastatin Calcium) Active 6/17/2023 6/17/2023    traZODone HCl Tablet 150 MG Active 6/29/2023 6/29/2023    Zoloft Oral Tablet 100 MG (Sertraline HCl) Active 6/18/2023 6/18/2023    LaMICtal Oral Tablet 100 MG (Lamotrigine) Active 7/5/2023 7/4/2023    Bactrim DS Oral Tablet 800-160 MG (Sulfamethoxazole-Trimethoprim) Active 7/22/2023 7/22/2023  Living will related issues reviewed-Code status:     OBJECTIVE:G35 MULTIPLE SCLEROSIS  8/1/2019    F33.1 MAJOR DEPRESSIVE DISORDER, RECURRENT, MODERATE  8/1/2019    F29 UNSP PSYCHOSIS NOT DUE TO A SUBSTANCE OR KNOWN PHYSIOL COND  8/1/2019    R45.851 SUICIDAL IDEATIONS  8/1/2019    G47.00 INSOMNIA, UNSPECIFIED  8/1/2019    D64.9 ANEMIA, UNSPECIFIED  8/1/2019    N31.9 NEUROMUSCULAR DYSFUNCTION OF BLADDER, UNSPECIFIED  10/20/2022    I87.323 CHRONIC VENOUS HTN W INFLAMMATION OF BILATERAL LOW EXTRM  10/20/2022    I87.313 CHRONIC VENOUS HYPERTENSION W ULCER OF BILATERAL LOW EXTRM  10/20/2022    I73.9 PERIPHERAL VASCULAR DISEASE, UNSPECIFIED  10/20/2022    L97.811 NON-PRS CHR ULCER OTH PRT R LOW LEG LIMITED TO BRKDWN SKIN  10/20/2022    L97.821 NON-PRS CHR ULCER OTH PRT L LOW LEG LIMITED TO BRKDWN SKIN  10/1/2022    F03.918 UNSP DEMENTIA, UNSP SEVERITY, WITH OTHER BEHAVIORAL DISTURB  4/6/2022    I87.312 CHRONIC VENOUS HYPERTENSION W ULCER OF L LOW EXTREM  4/6/2022    I87.311 CHRONIC VENOUS HYPERTENSION W ULCER OF R LOW EXTREM  2/24/2022    I87.2 VENOUS INSUFFICIENCY (CHRONIC) (PERIPHERAL  2/1/2022    R29.3 ABNORMAL POSTURE  12/10/2021    F22 DELUSIONAL DISORDERS  9/14/2021    M62.81 MUSCLE WEAKNESS (GENERALIZED  3/1/2021    N40.0 BENIGN PROSTATIC HYPERPLASIA WITHOUT LOWER URINRY TRACT SYMP  1/1/2021    Z86.16 PERSONAL HISTORY OF COVID-19  6/16/2020    Z87.440 PERSONAL HISTORY OF URINARY (TRACT) INFECTIONS  6/16/2020    R39.81 FUNCTIONAL URINARY INCONTINENCE  12/14/2019    K21.9 GASTRO-ESOPHAGEAL REFLUX DISEASE WITHOUT  ESOPHAGITIS  8/1/2019    E78.5 HYPERLIPIDEMIA, UNSPECIFIED  8/1/2019    F10.21 ALCOHOL DEPENDENCE, IN REMISSION  There were no vitals taken for this visit.  Physical Exam    Current Vitals     BP: 103/67 mmHg  8/7/2022 06:29  Temp:98.1 °F  10/10/2022 15:04  Pulse:67 bpm  8/7/2022 06:29    Weight:217 Lbs  7/3/2023 17:30  Resp:18 Breaths/min  10/6/2022 23:12  BS:  O2:97 %  10/6/2022 23:12  Pain:0  8/23/2023 10:09  Current Vitals     BP: 103/67 mmHg  8/7/2022 06:29  Temp:98.1 °F  10/10/2022 15:04  Pulse:67 bpm  8/7/2022 06:29      General: Lethargic HEENT: PERRLA. EOMI. MMM. Nares patent bl.  Cardiovascular: Heart murmur  Respiratory: Rhonchi  GI: Soft, NT abdomen. BS present x 4.   : No CVAT BL  MSK: ROM x 4. CTLS non-tender.   Extremities: No edema. Cap refill < 2 sec.   Skin: Stage II pressure ulcer lower extremity  Neuro: Myopathy neuropathy  Psych: Depression  Assessment/Plan  Problem List Items Addressed This Visit       Multiple sclerosis (CMS/HCC) - Primary    GERD without esophagitis    Moderate dementia with agitation (CMS/HCC)     Dementia is chronic neurodegenerative disorder where there is not cure and it can bring substantial burden to pt and family and caregivers.There are choices available for treatment of dementia and careful monitoring and follow up by us can help to reduce caregiver burden and keep pt homebound along with family members Dementia does not have sure and it can bring morbidity and mortality eventually. Mental or mind exercises, mindfulness, keeping active and cheerful, proper and well balanced diet and water consumption and brisk walks can be beneficial. Lots of studies are ongoing for dementia treatment. Forgetfulness, agitation, stubbornness, depression, insomnia, wandering are not uncommon. There are organizations , societies and support groups available for dementia related disorders.          Benign essential hypertension     Patients BP readings reviewed and addressed, as we age  our arteries turn stiffer and less elastic. Restricting salt consumption and staying physically fit with regular exercise regimen is the only way to keep our vasculature less tonic. Studies have shown that keeping ideal body wt, exercise routine about 140 to 150 minutes a week, eating variety of plant based diet and drinking plentiful water are quite helpful. Monitor BP twice or once a week at home and bring log to be reviewed by me. Uncontrolled BP has long term consequences including heart failure, myocardial infarction, accelerated atherosclerosis and kidney dysfunction. Therapy reviewed and explained.           Hyperlipemia, mixed    Pressure ulcer of coccygeal region, stage 2 (CMS/HCC)    Benign prostatic hyperplasia with urinary frequency    Iron deficiency anemia due to chronic blood loss   This patient was seen for my regular monthly visit, nursing evaluations and nursing notes were reviewed, interim events are reviewed, interim concerns and messages were reviewed as we have communicated with nursing staff.  Any issues with the falls, skin care impairment, declining physical condition are reviewed and noted, diagnosis list were reviewed, list of medications were reviewed, living will related issues were reviewed, overall patient has been doing well, any declining in patient's condition or any change in patient's condition needs to be notified to physician promptly, discussed with nursing staff, if needed would communicate with family.  Patient stays confined here at the facility for long-term care, there are always concerns about long-term care related issues and concerns.  Nursing staff is trying their best to keep patient safe, all sort of measures has been taken to keep patient safe and comfortable.   Skin integrity:  Nursing to monitor skin integrity as patient is at risk for pressure injuries.  Wound care per nursing    See Facility notes for measurements/assessments  Turn and reposition Q 2 hours or  more  Air mattress and when up in chair cushion reducing device  Dietician to evaluate and recommend:  Nutritional supplement:  Please monitor skin integrity and other pressure areas  Referral to wound clinic if appropriate:    PLAN:  Pt has been seen for follow up visit.  The patient is here at facility for PT/OT/ST to maximize strength, function, endurance and safety.  The patient is participating in therapy. Arnoldo Pearl is making progress to the best of his/her ability.   Please see PT/OT/ST notes in the facility for detailed information regarding progression of patients progress.   Recent nursing evaluation and notes were reviewed.   Overall, patient is stable despite his/her chronic conditions.    #  Any decline or change in condition needs to be communicated with the physician or myself.    Discussion with nursing staff regarding ongoing care and management.  If needed, would communicate with family who are not present at this time.   There are no concerns at this time.  We will continue with the medications noted above.    We will continue to follow the patient here at the facility.    Discharge planning:   Discharge Home when goals are met.   Follow up with PCP in 1-2 weeks after discharge from facility.   [facility] to follow after discharge for continued PT/OT and Nursing.    *Please note that nursing facility, outside laboratory agency, and  [facility] do not interface.     Completion of the note was done through Dragon voice recognition technology and may include   unintended or grammatical errors which may not have been recognized when finalizing the note.     Time:    Dennys Dean MD         Electronically Signed By: Dennys Dean MD   8/23/23  5:21 PM

## 2023-09-30 ENCOUNTER — NURSING HOME VISIT (OUTPATIENT)
Dept: POST ACUTE CARE | Facility: EXTERNAL LOCATION | Age: 68
End: 2023-09-30
Payer: COMMERCIAL

## 2023-09-30 DIAGNOSIS — G35 MULTIPLE SCLEROSIS (MULTI): Primary | ICD-10-CM

## 2023-09-30 DIAGNOSIS — L89.152 PRESSURE ULCER OF COCCYGEAL REGION, STAGE 2 (MULTI): ICD-10-CM

## 2023-09-30 DIAGNOSIS — I10 BENIGN ESSENTIAL HYPERTENSION: ICD-10-CM

## 2023-09-30 DIAGNOSIS — K21.9 GERD WITHOUT ESOPHAGITIS: ICD-10-CM

## 2023-09-30 DIAGNOSIS — D75.839 THROMBOCYTOSIS: ICD-10-CM

## 2023-09-30 DIAGNOSIS — E78.2 HYPERLIPEMIA, MIXED: ICD-10-CM

## 2023-09-30 PROCEDURE — 99308 SBSQ NF CARE LOW MDM 20: CPT | Performed by: INTERNAL MEDICINE

## 2023-09-30 NOTE — LETTER
Patient: Arnoldo Pearl  : 1955    Encounter Date: 2023    Name: Arnoldo Pearl  YOB: 1955    FOLLOW UP VISIT:   Allergies: No Known Allergies   [facility]    Special Instructions:    Diet: Regular diet, Regular texture, Thin liquids consistency  Diagnosis: MULTIPLE SCLEROSIS   SUBJECTIVE:WBC   67-year-old patient of obesity multiple sclerosis anemia anxiety depression complaining arthralgia myalgia fatigue tired weakness tingling numbness nonhealing wound lower extremity  Anemia hemoglobin 11.9-39.6 urinalysis stool occult blood iron level right to left lower leg wound advised wound care evaluation anxiety depression iron B12 folic acid Zoloft  10.7 RBCs 3.7 hemoglobin 9 and hematocrit 32 platelets 632 sodium 134 potassium 6.5 glucose 58 calcium 7.9G OT 45 prealbumin 11.4    Iron B12 folic acid supplement aspirin complex carbohydrate diet recently had a nonhealing wound given Bactrim patient is a hospice no potassium supplement repeat potassium level again    REVIEW OF SYSTEMS:   All review of systems are negative unless otherwise stated above under subjective.    LABS REVIEWED AT FACILITY:    MEDICATIONS REVIEWED AT FACILITY:     Tylenol Tablet 325 MG (Acetaminophen) Active 2022 10/28/2020    Baclofen Tablet 20 MG Active 2023  This order is potentially duplicated by other orders on the chart. Click to view details.    Ketoconazole Shampoo 2 % Active 2020  This order is potentially duplicated by other orders on the chart. Click to view details.    Ketoconazole Cream 2 % Active 2023    Flomax Capsule 0.4 MG (Tamsulosin HCl) Active 2023    Protonix Tablet Delayed Release 40 MG (Pantoprazole Sodium) Active 2023    Calcium Carbonate Tablet 1250 (500 Ca) MG Active 2022     Multivitamin-Minerals Tablet (Multiple Vitamins-Minerals) Active 2022     Aspirin EC Low Dose Tablet Delayed Release 81 MG  (Aspirin) Active 12/18/2022     Lipitor Tablet 20 MG (Atorvastatin Calcium) Active 6/17/2023 6/17/2023    traZODone HCl Tablet 150 MG Active 6/29/2023 6/29/2023    Zoloft Oral Tablet 100 MG (Sertraline HCl) Active 6/18/2023 6/18/2023    LaMICtal Oral Tablet 100 MG (Lamotrigine) Active 7/5/2023 7/4/2023    Bactrim DS Oral Tablet 800-160 MG (Sulfamethoxazole-Trimethoprim) Active 7/22/2023 7/22/2023  Living will related issues reviewed-Code status:     OBJECTIVE:G35 MULTIPLE SCLEROSIS  8/1/2019    F33.1 MAJOR DEPRESSIVE DISORDER, RECURRENT, MODERATE  8/1/2019    F29 UNSP PSYCHOSIS NOT DUE TO A SUBSTANCE OR KNOWN PHYSIOL COND  8/1/2019    R45.851 SUICIDAL IDEATIONS  8/1/2019    G47.00 INSOMNIA, UNSPECIFIED  8/1/2019    D64.9 ANEMIA, UNSPECIFIED  8/1/2019    N31.9 NEUROMUSCULAR DYSFUNCTION OF BLADDER, UNSPECIFIED  10/20/2022    I87.323 CHRONIC VENOUS HTN W INFLAMMATION OF BILATERAL LOW EXTRM  10/20/2022    I87.313 CHRONIC VENOUS HYPERTENSION W ULCER OF BILATERAL LOW EXTRM  10/20/2022    I73.9 PERIPHERAL VASCULAR DISEASE, UNSPECIFIED  10/20/2022    L97.811 NON-PRS CHR ULCER OTH PRT R LOW LEG LIMITED TO BRKDWN SKIN  10/20/2022    L97.821 NON-PRS CHR ULCER OTH PRT L LOW LEG LIMITED TO BRKDWN SKIN  10/1/2022    F03.918 UNSP DEMENTIA, UNSP SEVERITY, WITH OTHER BEHAVIORAL DISTURB  4/6/2022    I87.312 CHRONIC VENOUS HYPERTENSION W ULCER OF L LOW EXTREM  4/6/2022    I87.311 CHRONIC VENOUS HYPERTENSION W ULCER OF R LOW EXTREM  2/24/2022    I87.2 VENOUS INSUFFICIENCY (CHRONIC) (PERIPHERAL  2/1/2022    R29.3 ABNORMAL POSTURE  12/10/2021    F22 DELUSIONAL DISORDERS  9/14/2021    M62.81 MUSCLE WEAKNESS (GENERALIZED  3/1/2021    N40.0 BENIGN PROSTATIC HYPERPLASIA WITHOUT LOWER URINRY TRACT SYMP  1/1/2021    Z86.16 PERSONAL HISTORY OF COVID-19  6/16/2020    Z87.440 PERSONAL HISTORY OF URINARY (TRACT) INFECTIONS  6/16/2020    R39.81 FUNCTIONAL URINARY INCONTINENCE  12/14/2019    K21.9 GASTRO-ESOPHAGEAL REFLUX DISEASE WITHOUT  ESOPHAGITIS  8/1/2019    E78.5 HYPERLIPIDEMIA, UNSPECIFIED  8/1/2019    F10.21 ALCOHOL DEPENDENCE, IN REMISSION  There were no vitals taken for this visit.  Physical Exam  Blood pressure 118/66 heart rate 78 respiratory 18 temperature 98.6  General: Lethargic HEENT: PERRLA. EOMI. MMM. Nares patent bl.  Cardiovascular: Heart murmur  Respiratory: Rhonchi  GI: Soft, NT abdomen. BS present x 4.   : No CVAT BL  MSK: ROM x 4. CTLS non-tender.   Extremities: No edema. Cap refill < 2 sec.   Skin: Stage II pressure ulcer lower extremity  Neuro: Myopathy neuropathy  Psych: Depression  Assessment/Plan  Problem List Items Addressed This Visit       Multiple sclerosis (CMS/[facility]) - Primary         Neuropsych evaluation         GERD without esophagitis    Benign essential hypertension     Patients BP readings reviewed and addressed, as we age our arteries turn stiffer and less elastic. Restricting salt consumption and staying physically fit with regular exercise regimen is the only way to keep our vasculature less tonic. Studies have shown that keeping ideal body wt, exercise routine about 140 to 150 minutes a week, eating variety of plant based diet and drinking plentiful water are quite helpful. Monitor BP twice or once a week at home and bring log to be reviewed by me. Uncontrolled BP has long term consequences including heart failure, myocardial infarction, accelerated atherosclerosis and kidney dysfunction. Therapy reviewed and explained.           Hyperlipemia, mixed    Pressure ulcer of coccygeal region, stage 2 (CMS/[facility])       Wound care evaluation dietitian evaluation         Thrombocytosis   This patient was seen for my regular monthly visit, nursing evaluations and nursing notes were reviewed, interim events are reviewed, interim concerns and messages were reviewed as we have communicated with nursing staff.  Any issues with the falls, skin care impairment, declining physical condition are reviewed and noted, diagnosis  list were reviewed, list of medications were reviewed, living will related issues were reviewed, overall patient has been doing well, any declining in patient's condition or any change in patient's condition needs to be notified to physician promptly, discussed with nursing staff, if needed would communicate with family.  Patient stays confined here at the facility for long-term care, there are always concerns about long-term care related issues and concerns.  Nursing staff is trying their best to keep patient safe, all sort of measures has been taken to keep patient safe and comfortable.   Skin integrity:  Nursing to monitor skin integrity as patient is at risk for pressure injuries.  Wound care per nursing    See Facility notes for measurements/assessments  Turn and reposition Q 2 hours or more  Air mattress and when up in chair cushion reducing device  Dietician to evaluate and recommend:  Nutritional supplement:  Please monitor skin integrity and other pressure areas  Referral to wound clinic if appropriate:    PLAN:  Pt has been seen for follow up visit.  The patient is here at facility for PT/OT/ST to maximize strength, function, endurance and safety.  The patient is participating in therapy. Arnoldo Pearl is making progress to the best of his/her ability.   Please see PT/OT/ST notes in the facility for detailed information regarding progression of patients progress.   Recent nursing evaluation and notes were reviewed.   Overall, patient is stable despite his/her chronic conditions.    #  Any decline or change in condition needs to be communicated with the physician or myself.    Discussion with nursing staff regarding ongoing care and management.  If needed, would communicate with family who are not present at this time.   There are no concerns at this time.  We will continue with the medications noted above.    We will continue to follow the patient here at the facility.    Discharge planning:   Discharge Home  when goals are met.   Follow up with PCP in 1-2 weeks after discharge from facility.   [facility]HC to follow after discharge for continued PT/OT and Nursing.    *Please note that nursing facility, outside laboratory agency, and  [facility] do not interface.     Completion of the note was done through Dragon voice recognition technology and may include   unintended or grammatical errors which may not have been recognized when finalizing the note.     Time:    Dennys Dean MD         Electronically Signed By: Dennys Dean MD   11/5/23  3:24 PM

## 2023-11-05 NOTE — PROGRESS NOTES
Name: Arnoldo Pearl  YOB: 1955    FOLLOW UP VISIT:   Allergies: No Known Allergies   [facility]    Special Instructions:    Diet: Regular diet, Regular texture, Thin liquids consistency  Diagnosis: MULTIPLE SCLEROSIS   SUBJECTIVE:WBC   67-year-old patient of obesity multiple sclerosis anemia anxiety depression complaining arthralgia myalgia fatigue tired weakness tingling numbness nonhealing wound lower extremity  Anemia hemoglobin 11.9-39.6 urinalysis stool occult blood iron level right to left lower leg wound advised wound care evaluation anxiety depression iron B12 folic acid Zoloft  10.7 RBCs 3.7 hemoglobin 9 and hematocrit 32 platelets 632 sodium 134 potassium 6.5 glucose 58 calcium 7.9G OT 45 prealbumin 11.4    Iron B12 folic acid supplement aspirin complex carbohydrate diet recently had a nonhealing wound given Bactrim patient is a hospice no potassium supplement repeat potassium level again    REVIEW OF SYSTEMS:   All review of systems are negative unless otherwise stated above under subjective.    LABS REVIEWED AT FACILITY:    MEDICATIONS REVIEWED AT FACILITY:     Tylenol Tablet 325 MG (Acetaminophen) Active 7/5/2022 10/28/2020    Baclofen Tablet 20 MG Active 6/6/2023 6/6/2023  This order is potentially duplicated by other orders on the chart. Click to view details.    Ketoconazole Shampoo 2 % Active 8/9/2020 8/8/2020  This order is potentially duplicated by other orders on the chart. Click to view details.    Ketoconazole Cream 2 % Active 6/6/2023 6/6/2023    Flomax Capsule 0.4 MG (Tamsulosin HCl) Active 6/14/2023 6/14/2023    Protonix Tablet Delayed Release 40 MG (Pantoprazole Sodium) Active 6/30/2023 6/30/2023    Calcium Carbonate Tablet 1250 (500 Ca) MG Active 7/7/2022     Multivitamin-Minerals Tablet (Multiple Vitamins-Minerals) Active 7/7/2022     Aspirin EC Low Dose Tablet Delayed Release 81 MG (Aspirin) Active 12/18/2022     Lipitor Tablet 20 MG (Atorvastatin  Calcium) Active 6/17/2023 6/17/2023    traZODone HCl Tablet 150 MG Active 6/29/2023 6/29/2023    Zoloft Oral Tablet 100 MG (Sertraline HCl) Active 6/18/2023 6/18/2023    LaMICtal Oral Tablet 100 MG (Lamotrigine) Active 7/5/2023 7/4/2023    Bactrim DS Oral Tablet 800-160 MG (Sulfamethoxazole-Trimethoprim) Active 7/22/2023 7/22/2023  Living will related issues reviewed-Code status:     OBJECTIVE:G35 MULTIPLE SCLEROSIS  8/1/2019    F33.1 MAJOR DEPRESSIVE DISORDER, RECURRENT, MODERATE  8/1/2019    F29 UNSP PSYCHOSIS NOT DUE TO A SUBSTANCE OR KNOWN PHYSIOL COND  8/1/2019    R45.851 SUICIDAL IDEATIONS  8/1/2019    G47.00 INSOMNIA, UNSPECIFIED  8/1/2019    D64.9 ANEMIA, UNSPECIFIED  8/1/2019    N31.9 NEUROMUSCULAR DYSFUNCTION OF BLADDER, UNSPECIFIED  10/20/2022    I87.323 CHRONIC VENOUS HTN W INFLAMMATION OF BILATERAL LOW EXTRM  10/20/2022    I87.313 CHRONIC VENOUS HYPERTENSION W ULCER OF BILATERAL LOW EXTRM  10/20/2022    I73.9 PERIPHERAL VASCULAR DISEASE, UNSPECIFIED  10/20/2022    L97.811 NON-PRS CHR ULCER OTH PRT R LOW LEG LIMITED TO BRKDWN SKIN  10/20/2022    L97.821 NON-PRS CHR ULCER OTH PRT L LOW LEG LIMITED TO BRKDWN SKIN  10/1/2022    F03.918 UNSP DEMENTIA, UNSP SEVERITY, WITH OTHER BEHAVIORAL DISTURB  4/6/2022    I87.312 CHRONIC VENOUS HYPERTENSION W ULCER OF L LOW EXTREM  4/6/2022    I87.311 CHRONIC VENOUS HYPERTENSION W ULCER OF R LOW EXTREM  2/24/2022    I87.2 VENOUS INSUFFICIENCY (CHRONIC) (PERIPHERAL  2/1/2022    R29.3 ABNORMAL POSTURE  12/10/2021    F22 DELUSIONAL DISORDERS  9/14/2021    M62.81 MUSCLE WEAKNESS (GENERALIZED  3/1/2021    N40.0 BENIGN PROSTATIC HYPERPLASIA WITHOUT LOWER URINRY TRACT SYMP  1/1/2021    Z86.16 PERSONAL HISTORY OF COVID-19  6/16/2020    Z87.440 PERSONAL HISTORY OF URINARY (TRACT) INFECTIONS  6/16/2020    R39.81 FUNCTIONAL URINARY INCONTINENCE  12/14/2019    K21.9 GASTRO-ESOPHAGEAL REFLUX DISEASE WITHOUT ESOPHAGITIS  8/1/2019    E78.5 HYPERLIPIDEMIA, UNSPECIFIED  8/1/2019     F10.21 ALCOHOL DEPENDENCE, IN REMISSION  There were no vitals taken for this visit.  Physical Exam  Blood pressure 118/66 heart rate 78 respiratory 18 temperature 98.6  General: Lethargic HEENT: PERRLA. EOMI. MMM. Nares patent bl.  Cardiovascular: Heart murmur  Respiratory: Rhonchi  GI: Soft, NT abdomen. BS present x 4.   : No CVAT BL  MSK: ROM x 4. CTLS non-tender.   Extremities: No edema. Cap refill < 2 sec.   Skin: Stage II pressure ulcer lower extremity  Neuro: Myopathy neuropathy  Psych: Depression  Assessment/Plan   Problem List Items Addressed This Visit       Multiple sclerosis (CMS/[facility]) - Primary         Neuropsych evaluation         GERD without esophagitis    Benign essential hypertension     Patients BP readings reviewed and addressed, as we age our arteries turn stiffer and less elastic. Restricting salt consumption and staying physically fit with regular exercise regimen is the only way to keep our vasculature less tonic. Studies have shown that keeping ideal body wt, exercise routine about 140 to 150 minutes a week, eating variety of plant based diet and drinking plentiful water are quite helpful. Monitor BP twice or once a week at home and bring log to be reviewed by me. Uncontrolled BP has long term consequences including heart failure, myocardial infarction, accelerated atherosclerosis and kidney dysfunction. Therapy reviewed and explained.           Hyperlipemia, mixed    Pressure ulcer of coccygeal region, stage 2 (CMS/[facility])       Wound care evaluation dietitian evaluation         Thrombocytosis   This patient was seen for my regular monthly visit, nursing evaluations and nursing notes were reviewed, interim events are reviewed, interim concerns and messages were reviewed as we have communicated with nursing staff.  Any issues with the falls, skin care impairment, declining physical condition are reviewed and noted, diagnosis list were reviewed, list of medications were reviewed, living will  related issues were reviewed, overall patient has been doing well, any declining in patient's condition or any change in patient's condition needs to be notified to physician promptly, discussed with nursing staff, if needed would communicate with family.  Patient stays confined here at the facility for long-term care, there are always concerns about long-term care related issues and concerns.  Nursing staff is trying their best to keep patient safe, all sort of measures has been taken to keep patient safe and comfortable.   Skin integrity:  Nursing to monitor skin integrity as patient is at risk for pressure injuries.  Wound care per nursing    See Facility notes for measurements/assessments  Turn and reposition Q 2 hours or more  Air mattress and when up in chair cushion reducing device  Dietician to evaluate and recommend:  Nutritional supplement:  Please monitor skin integrity and other pressure areas  Referral to wound clinic if appropriate:    PLAN:  Pt has been seen for follow up visit.  The patient is here at facility for PT/OT/ST to maximize strength, function, endurance and safety.  The patient is participating in therapy. Arnoldo Pearl is making progress to the best of his/her ability.   Please see PT/OT/ST notes in the facility for detailed information regarding progression of patients progress.   Recent nursing evaluation and notes were reviewed.   Overall, patient is stable despite his/her chronic conditions.    #  Any decline or change in condition needs to be communicated with the physician or myself.    Discussion with nursing staff regarding ongoing care and management.  If needed, would communicate with family who are not present at this time.   There are no concerns at this time.  We will continue with the medications noted above.    We will continue to follow the patient here at the facility.    Discharge planning:   Discharge Home when goals are met.   Follow up with PCP in 1-2 weeks after  discharge from facility.   C to follow after discharge for continued PT/OT and Nursing.    *Please note that nursing facility, outside laboratory agency, and  [facility] do not interface.     Completion of the note was done through Dragon voice recognition technology and may include   unintended or grammatical errors which may not have been recognized when finalizing the note.     Time:    Dennys Dean MD

## 2024-01-03 ENCOUNTER — NURSING HOME VISIT (OUTPATIENT)
Dept: POST ACUTE CARE | Facility: EXTERNAL LOCATION | Age: 69
End: 2024-01-03
Payer: COMMERCIAL

## 2024-01-03 DIAGNOSIS — I10 BENIGN ESSENTIAL HYPERTENSION: ICD-10-CM

## 2024-01-03 DIAGNOSIS — F02.B11: ICD-10-CM

## 2024-01-03 DIAGNOSIS — R35.0 BENIGN PROSTATIC HYPERPLASIA WITH URINARY FREQUENCY: ICD-10-CM

## 2024-01-03 DIAGNOSIS — G35 MULTIPLE SCLEROSIS (MULTI): Primary | ICD-10-CM

## 2024-01-03 DIAGNOSIS — D47.3 ESSENTIAL (HEMORRHAGIC) THROMBOCYTHEMIA (MULTI): ICD-10-CM

## 2024-01-03 DIAGNOSIS — L89.892: ICD-10-CM

## 2024-01-03 DIAGNOSIS — F10.27: ICD-10-CM

## 2024-01-03 DIAGNOSIS — R73.9 HYPERGLYCEMIA: ICD-10-CM

## 2024-01-03 DIAGNOSIS — N40.1 BENIGN PROSTATIC HYPERPLASIA WITH URINARY FREQUENCY: ICD-10-CM

## 2024-01-03 DIAGNOSIS — E78.2 HYPERLIPEMIA, MIXED: ICD-10-CM

## 2024-01-03 DIAGNOSIS — K21.9 GERD WITHOUT ESOPHAGITIS: ICD-10-CM

## 2024-01-03 DIAGNOSIS — G31.09: ICD-10-CM

## 2024-01-03 PROCEDURE — 99309 SBSQ NF CARE MODERATE MDM 30: CPT | Performed by: INTERNAL MEDICINE

## 2024-01-03 NOTE — LETTER
Patient: Arnoldo Pearl  : 1955    Encounter Date: 2024    Name: Arnoldo Pearl  YOB: 1955    FOLLOW UP VISIT:   Allergies: No Known Allergies   Code Status: [facility]    Special Instructions:    Diet: Regular diet, Regular texture, Thin liquids consistency  Diagnosis: MULTIPLE SCLEROSIS   SUBJECTIVE:WBC 68-year-old patient evaluated for  Evaluated for insomnia multiple sclerosis nonhealing wound advised wound care evaluation specifically focused on the right lower extremity and left lower extremity nutrition wound care evaluation continue trazodone    Anemia H&H 11.1 37.6 mild dehydration      Iron B12 folic acid supplement aspirin complex carbohydrate diet recently had a nonhealing wound given Bactrim patient is a hospice no potassium supplement repeat potassium level again    REVIEW OF SYSTEMS:   All review of systems are negative unless otherwise stated above under subjective.    LABS REVIEWED AT FACILITY:    MEDICATIONS REVIEWED AT FACILITY:     Tylenol Tablet 325 MG (Acetaminophen) Active 2022 10/28/2020    Baclofen Tablet 20 MG Active 2023  This order is potentially duplicated by other orders on the chart. Click to view details.    Ketoconazole Shampoo 2 % Active 2020  This order is potentially duplicated by other orders on the chart. Click to view details.    Ketoconazole Cream 2 % Active 2023    Flomax Capsule 0.4 MG (Tamsulosin HCl) Active 2023    Protonix Tablet Delayed Release 40 MG (Pantoprazole Sodium) Active 2023    Calcium Carbonate Tablet 1250 (500 Ca) MG Active 2022     Multivitamin-Minerals Tablet (Multiple Vitamins-Minerals) Active 2022     Aspirin EC Low Dose Tablet Delayed Release 81 MG (Aspirin) Active 2022     Lipitor Tablet 20 MG (Atorvastatin Calcium) Active 2023    traZODone HCl Tablet 150 MG Active 2023    Zoloft Oral Tablet 100 MG (Sertraline  HCl) Active 6/18/2023 6/18/2023    LaMICtal Oral Tablet 100 MG (Lamotrigine) Active 7/5/2023 7/4/2023    Bactrim DS Oral Tablet 800-160 MG (Sulfamethoxazole-Trimethoprim) Active 7/22/2023 7/22/2023  Living will related issues reviewed-Code status:     OBJECTIVE:G35 MULTIPLE SCLEROSIS  8/1/2019    F33.1 MAJOR DEPRESSIVE DISORDER, RECURRENT, MODERATE  8/1/2019    F29 UNSP PSYCHOSIS NOT DUE TO A SUBSTANCE OR KNOWN PHYSIOL COND  8/1/2019    R45.851 SUICIDAL IDEATIONS  8/1/2019    G47.00 INSOMNIA, UNSPECIFIED  8/1/2019    D64.9 ANEMIA, UNSPECIFIED  8/1/2019    N31.9 NEUROMUSCULAR DYSFUNCTION OF BLADDER, UNSPECIFIED  10/20/2022    I87.323 CHRONIC VENOUS HTN W INFLAMMATION OF BILATERAL LOW EXTRM  10/20/2022    I87.313 CHRONIC VENOUS HYPERTENSION W ULCER OF BILATERAL LOW EXTRM  10/20/2022    I73.9 PERIPHERAL VASCULAR DISEASE, UNSPECIFIED  10/20/2022    L97.811 NON-PRS CHR ULCER OTH PRT R LOW LEG LIMITED TO BRKDWN SKIN  10/20/2022    L97.821 NON-PRS CHR ULCER OTH PRT L LOW LEG LIMITED TO BRKDWN SKIN  10/1/2022    F03.918 UNSP DEMENTIA, UNSP SEVERITY, WITH OTHER BEHAVIORAL DISTURB  4/6/2022    I87.312 CHRONIC VENOUS HYPERTENSION W ULCER OF L LOW EXTREM  4/6/2022    I87.311 CHRONIC VENOUS HYPERTENSION W ULCER OF R LOW EXTREM  2/24/2022    I87.2 VENOUS INSUFFICIENCY (CHRONIC) (PERIPHERAL  2/1/2022    R29.3 ABNORMAL POSTURE  12/10/2021    F22 DELUSIONAL DISORDERS  9/14/2021    M62.81 MUSCLE WEAKNESS (GENERALIZED  3/1/2021    N40.0 BENIGN PROSTATIC HYPERPLASIA WITHOUT LOWER URINRY TRACT SYMP  1/1/2021    Z86.16 PERSONAL HISTORY OF COVID-19  6/16/2020    Z87.440 PERSONAL HISTORY OF URINARY (TRACT) INFECTIONS  6/16/2020    R39.81 FUNCTIONAL URINARY INCONTINENCE  12/14/2019    K21.9 GASTRO-ESOPHAGEAL REFLUX DISEASE WITHOUT ESOPHAGITIS  8/1/2019    E78.5 HYPERLIPIDEMIA, UNSPECIFIED  8/1/2019    F10.21 ALCOHOL DEPENDENCE, IN REMISSION  There were no vitals taken for this visit.  Physical Exam  Current Vitals   BP: 126/62  mmHg  12/26/2023 02:42  Temp:97.7 °F  12/26/2023 11:11  Pulse:80 bpm  12/26/2023 02:42  Weight:215.8 Lbs  12/2/2023 18:21  Resp:16 Breaths/min  12/26/2023 02:42  BS:  O2:98 %  12/26/2023 02:42  Pain:0  1/7/2024 07:34    General: Lethargic HEENT: PERRLA. EOMI. MMM. Nares patent bl.  Cardiovascular: Heart murmur  Respiratory: Rhonchi  GI: Soft, NT abdomen. BS present x 4.   : No CVAT BL  MSK: ROM x 4. CTLS non-tender.   Extremities: No edema. Cap refill < 2 sec.   Skin: Stage II pressure ulcer lower extremity  Neuro: Myopathy neuropathy  Psych: Depression  Assessment/Plan  Advanced multiple sclerosis with anxiety depression  Anemia with gastritis  Chronic nonhealing wound lower extremity  PAD CAD  Insomnia  Gastritis  Dry skin  Mild dehydration  Neurogenic bladder bowel dysfunction    Recommendation plan as follow    Skin care bladder care bowel GI DVT prophylaxis  Wound care evaluation nutrition evaluation  Insomnia melatonin trazodone  Behavior problem Lamictal  Depression Zoloft PHQ twice a year  Hyperlipidemia on Lipitor 20 mg a day monitor LFT lipid CPK once a year  BPH Flomax Proscar PSA twice a year  Muscle spasm baclofen acetaminophen  Protein calorie malnutrition dietitian evaluation  DNR CC no hospitalization  Symptom management  Problem List Items Addressed This Visit       Multiple sclerosis (CMS/HCC) - Primary    GERD without esophagitis    Moderate dementia with agitation (CMS/HCC)    Benign essential hypertension    Hyperlipemia, mixed    Benign prostatic hyperplasia with urinary frequency    Essential (hemorrhagic) thrombocythemia (CMS/HCC)    Moderate dementia associated with alcoholism, with agitation (CMS/HCC)    Pressure injury of lower extremity, stage 2 (CMS/HCC)   This patient was seen for my regular monthly visit, nursing evaluations and nursing notes were reviewed, interim events are reviewed, interim concerns and messages were reviewed as we have communicated with nursing staff.  Any issues  with the falls, skin care impairment, declining physical condition are reviewed and noted, diagnosis list were reviewed, list of medications were reviewed, living will related issues were reviewed, overall patient has been doing well, any declining in patient's condition or any change in patient's condition needs to be notified to physician promptly, discussed with nursing staff, if needed would communicate with family.  Patient stays confined here at the facility for long-term care, there are always concerns about long-term care related issues and concerns.  Nursing staff is trying their best to keep patient safe, all sort of measures has been taken to keep patient safe and comfortable.   Skin integrity:  Nursing to monitor skin integrity as patient is at risk for pressure injuries.  Wound care per nursing    See Facility notes for measurements/assessments  Turn and reposition Q 2 hours or more  Air mattress and when up in chair cushion reducing device  Dietician to evaluate and recommend:  Nutritional supplement:  Please monitor skin integrity and other pressure areas  Referral to wound clinic if appropriate:    PLAN:  Pt has been seen for follow up visit.  The patient is here at facility for PT/OT/ST to maximize strength, function, endurance and safety.  The patient is participating in therapy. Arnoldo Pearl is making progress to the best of his/her ability.   Please see PT/OT/ST notes in the facility for detailed information regarding progression of patients progress.   Recent nursing evaluation and notes were reviewed.   Overall, patient is stable despite his/her chronic conditions.    #  Any decline or change in condition needs to be communicated with the physician or myself.    Discussion with nursing staff regarding ongoing care and management.  If needed, would communicate with family who are not present at this time.   There are no concerns at this time.  We will continue with the medications noted above.     We will continue to follow the patient here at the facility.    Discharge planning:   Discharge Home when goals are met.   Follow up with PCP in 1-2 weeks after discharge from facility.   HHC to follow after discharge for continued PT/OT and Nursing.    *Please note that nursing facility, outside laboratory agency, and  [facility] do not interface.     Completion of the note was done through Dragon voice recognition technology and may include   unintended or grammatical errors which may not have been recognized when finalizing the note.     Time:    Dennys Dean MD         Electronically Signed By: Dennys Dean MD   1/7/24  2:37 PM

## 2024-01-07 PROBLEM — D47.3 ESSENTIAL (HEMORRHAGIC) THROMBOCYTHEMIA (MULTI): Status: ACTIVE | Noted: 2023-07-22

## 2024-01-07 PROBLEM — F10.27: Status: ACTIVE | Noted: 2024-01-07

## 2024-01-07 PROBLEM — L89.892: Status: ACTIVE | Noted: 2024-01-07

## 2024-01-07 PROBLEM — D50.0 IRON DEFICIENCY ANEMIA DUE TO CHRONIC BLOOD LOSS: Status: RESOLVED | Noted: 2023-07-22 | Resolved: 2024-01-07

## 2024-01-07 NOTE — PROGRESS NOTES
Name: Arnoldo Pearl  YOB: 1955    FOLLOW UP VISIT:   Allergies: No Known Allergies   Code Status: [facility]    Special Instructions:    Diet: Regular diet, Regular texture, Thin liquids consistency  Diagnosis: MULTIPLE SCLEROSIS   SUBJECTIVE:WBC 68-year-old patient evaluated for  Evaluated for insomnia multiple sclerosis nonhealing wound advised wound care evaluation specifically focused on the right lower extremity and left lower extremity nutrition wound care evaluation continue trazodone    Anemia H&H 11.1 37.6 mild dehydration      Iron B12 folic acid supplement aspirin complex carbohydrate diet recently had a nonhealing wound given Bactrim patient is a hospice no potassium supplement repeat potassium level again    REVIEW OF SYSTEMS:   All review of systems are negative unless otherwise stated above under subjective.    LABS REVIEWED AT FACILITY:    MEDICATIONS REVIEWED AT FACILITY:     Tylenol Tablet 325 MG (Acetaminophen) Active 7/5/2022 10/28/2020    Baclofen Tablet 20 MG Active 6/6/2023 6/6/2023  This order is potentially duplicated by other orders on the chart. Click to view details.    Ketoconazole Shampoo 2 % Active 8/9/2020 8/8/2020  This order is potentially duplicated by other orders on the chart. Click to view details.    Ketoconazole Cream 2 % Active 6/6/2023 6/6/2023    Flomax Capsule 0.4 MG (Tamsulosin HCl) Active 6/14/2023 6/14/2023    Protonix Tablet Delayed Release 40 MG (Pantoprazole Sodium) Active 6/30/2023 6/30/2023    Calcium Carbonate Tablet 1250 (500 Ca) MG Active 7/7/2022     Multivitamin-Minerals Tablet (Multiple Vitamins-Minerals) Active 7/7/2022     Aspirin EC Low Dose Tablet Delayed Release 81 MG (Aspirin) Active 12/18/2022     Lipitor Tablet 20 MG (Atorvastatin Calcium) Active 6/17/2023 6/17/2023    traZODone HCl Tablet 150 MG Active 6/29/2023 6/29/2023    Zoloft Oral Tablet 100 MG (Sertraline HCl) Active 6/18/2023 6/18/2023    LaMICtal Oral Tablet 100 MG  (Lamotrigine) Active 7/5/2023 7/4/2023    Bactrim DS Oral Tablet 800-160 MG (Sulfamethoxazole-Trimethoprim) Active 7/22/2023 7/22/2023  Living will related issues reviewed-Code status:     OBJECTIVE:G35 MULTIPLE SCLEROSIS  8/1/2019    F33.1 MAJOR DEPRESSIVE DISORDER, RECURRENT, MODERATE  8/1/2019    F29 UNSP PSYCHOSIS NOT DUE TO A SUBSTANCE OR KNOWN PHYSIOL COND  8/1/2019    R45.851 SUICIDAL IDEATIONS  8/1/2019    G47.00 INSOMNIA, UNSPECIFIED  8/1/2019    D64.9 ANEMIA, UNSPECIFIED  8/1/2019    N31.9 NEUROMUSCULAR DYSFUNCTION OF BLADDER, UNSPECIFIED  10/20/2022    I87.323 CHRONIC VENOUS HTN W INFLAMMATION OF BILATERAL LOW EXTRM  10/20/2022    I87.313 CHRONIC VENOUS HYPERTENSION W ULCER OF BILATERAL LOW EXTRM  10/20/2022    I73.9 PERIPHERAL VASCULAR DISEASE, UNSPECIFIED  10/20/2022    L97.811 NON-PRS CHR ULCER OTH PRT R LOW LEG LIMITED TO BRKDWN SKIN  10/20/2022    L97.821 NON-PRS CHR ULCER OTH PRT L LOW LEG LIMITED TO BRKDWN SKIN  10/1/2022    F03.918 UNSP DEMENTIA, UNSP SEVERITY, WITH OTHER BEHAVIORAL DISTURB  4/6/2022    I87.312 CHRONIC VENOUS HYPERTENSION W ULCER OF L LOW EXTREM  4/6/2022    I87.311 CHRONIC VENOUS HYPERTENSION W ULCER OF R LOW EXTREM  2/24/2022    I87.2 VENOUS INSUFFICIENCY (CHRONIC) (PERIPHERAL  2/1/2022    R29.3 ABNORMAL POSTURE  12/10/2021    F22 DELUSIONAL DISORDERS  9/14/2021    M62.81 MUSCLE WEAKNESS (GENERALIZED  3/1/2021    N40.0 BENIGN PROSTATIC HYPERPLASIA WITHOUT LOWER URINRY TRACT SYMP  1/1/2021    Z86.16 PERSONAL HISTORY OF COVID-19  6/16/2020    Z87.440 PERSONAL HISTORY OF URINARY (TRACT) INFECTIONS  6/16/2020    R39.81 FUNCTIONAL URINARY INCONTINENCE  12/14/2019    K21.9 GASTRO-ESOPHAGEAL REFLUX DISEASE WITHOUT ESOPHAGITIS  8/1/2019    E78.5 HYPERLIPIDEMIA, UNSPECIFIED  8/1/2019    F10.21 ALCOHOL DEPENDENCE, IN REMISSION  There were no vitals taken for this visit.  Physical Exam  Current Vitals   BP: 126/62 mmHg  12/26/2023 02:42  Temp:97.7 °F  12/26/2023 11:11  Pulse:80  bpm  12/26/2023 02:42  Weight:215.8 Lbs  12/2/2023 18:21  Resp:16 Breaths/min  12/26/2023 02:42  BS:  O2:98 %  12/26/2023 02:42  Pain:0  1/7/2024 07:34    General: Lethargic HEENT: PERRLA. EOMI. MMM. Nares patent bl.  Cardiovascular: Heart murmur  Respiratory: Rhonchi  GI: Soft, NT abdomen. BS present x 4.   : No CVAT BL  MSK: ROM x 4. CTLS non-tender.   Extremities: No edema. Cap refill < 2 sec.   Skin: Stage II pressure ulcer lower extremity  Neuro: Myopathy neuropathy  Psych: Depression  Assessment/Plan   Advanced multiple sclerosis with anxiety depression  Anemia with gastritis  Chronic nonhealing wound lower extremity  PAD CAD  Insomnia  Gastritis  Dry skin  Mild dehydration  Neurogenic bladder bowel dysfunction    Recommendation plan as follow    Skin care bladder care bowel GI DVT prophylaxis  Wound care evaluation nutrition evaluation  Insomnia melatonin trazodone  Behavior problem Lamictal  Depression Zoloft PHQ twice a year  Hyperlipidemia on Lipitor 20 mg a day monitor LFT lipid CPK once a year  BPH Flomax Proscar PSA twice a year  Muscle spasm baclofen acetaminophen  Protein calorie malnutrition dietitian evaluation  DNR CC no hospitalization  Symptom management  Problem List Items Addressed This Visit       Multiple sclerosis (CMS/HCC) - Primary    GERD without esophagitis    Moderate dementia with agitation (CMS/HCC)    Benign essential hypertension    Hyperlipemia, mixed    Benign prostatic hyperplasia with urinary frequency    Essential (hemorrhagic) thrombocythemia (CMS/HCC)    Moderate dementia associated with alcoholism, with agitation (CMS/HCC)    Pressure injury of lower extremity, stage 2 (CMS/HCC)   This patient was seen for my regular monthly visit, nursing evaluations and nursing notes were reviewed, interim events are reviewed, interim concerns and messages were reviewed as we have communicated with nursing staff.  Any issues with the falls, skin care impairment, declining physical  condition are reviewed and noted, diagnosis list were reviewed, list of medications were reviewed, living will related issues were reviewed, overall patient has been doing well, any declining in patient's condition or any change in patient's condition needs to be notified to physician promptly, discussed with nursing staff, if needed would communicate with family.  Patient stays confined here at the facility for long-term care, there are always concerns about long-term care related issues and concerns.  Nursing staff is trying their best to keep patient safe, all sort of measures has been taken to keep patient safe and comfortable.   Skin integrity:  Nursing to monitor skin integrity as patient is at risk for pressure injuries.  Wound care per nursing    See Facility notes for measurements/assessments  Turn and reposition Q 2 hours or more  Air mattress and when up in chair cushion reducing device  Dietician to evaluate and recommend:  Nutritional supplement:  Please monitor skin integrity and other pressure areas  Referral to wound clinic if appropriate:    PLAN:  Pt has been seen for follow up visit.  The patient is here at facility for PT/OT/ST to maximize strength, function, endurance and safety.  The patient is participating in therapy. Arnoldo Pearl is making progress to the best of his/her ability.   Please see PT/OT/ST notes in the facility for detailed information regarding progression of patients progress.   Recent nursing evaluation and notes were reviewed.   Overall, patient is stable despite his/her chronic conditions.    #  Any decline or change in condition needs to be communicated with the physician or myself.    Discussion with nursing staff regarding ongoing care and management.  If needed, would communicate with family who are not present at this time.   There are no concerns at this time.  We will continue with the medications noted above.    We will continue to follow the patient here at the  facility.    Discharge planning:   Discharge Home when goals are met.   Follow up with PCP in 1-2 weeks after discharge from facility.   HHC to follow after discharge for continued PT/OT and Nursing.    *Please note that nursing facility, outside laboratory agency, and  [facility] do not interface.     Completion of the note was done through Dragon voice recognition technology and may include   unintended or grammatical errors which may not have been recognized when finalizing the note.     Time:    Dennys Dean MD

## 2024-03-17 ENCOUNTER — NURSING HOME VISIT (OUTPATIENT)
Dept: POST ACUTE CARE | Facility: EXTERNAL LOCATION | Age: 69
End: 2024-03-17
Payer: COMMERCIAL

## 2024-03-17 DIAGNOSIS — G35 MULTIPLE SCLEROSIS (MULTI): ICD-10-CM

## 2024-03-17 DIAGNOSIS — I83.009 STASIS ULCER OF LOWER EXTREMITY, UNSPECIFIED LATERALITY (MULTI): ICD-10-CM

## 2024-03-17 DIAGNOSIS — G83.4 CAUDA EQUINA SYNDROME WITH NEUROGENIC BLADDER (MULTI): Primary | ICD-10-CM

## 2024-03-17 DIAGNOSIS — N40.0 BENIGN PROSTATIC HYPERPLASIA WITHOUT LOWER URINARY TRACT SYMPTOMS: ICD-10-CM

## 2024-03-17 DIAGNOSIS — E78.2 HYPERLIPEMIA, MIXED: ICD-10-CM

## 2024-03-17 DIAGNOSIS — F51.04 CHRONIC INSOMNIA: ICD-10-CM

## 2024-03-17 DIAGNOSIS — D50.0 IRON DEFICIENCY ANEMIA DUE TO CHRONIC BLOOD LOSS: ICD-10-CM

## 2024-03-17 DIAGNOSIS — L97.909 STASIS ULCER OF LOWER EXTREMITY, UNSPECIFIED LATERALITY (MULTI): ICD-10-CM

## 2024-03-17 PROBLEM — I74.9 EMBOLISM AND THROMBOSIS (MULTI): Status: ACTIVE | Noted: 2024-03-17

## 2024-03-17 PROBLEM — I74.9 EMBOLISM AND THROMBOSIS (MULTI): Status: RESOLVED | Noted: 2024-03-17 | Resolved: 2024-03-17

## 2024-03-17 PROCEDURE — 99309 SBSQ NF CARE MODERATE MDM 30: CPT | Performed by: INTERNAL MEDICINE

## 2024-03-17 NOTE — PROGRESS NOTES
Name: Arnoldo Pearl  YOB: 1955    FOLLOW UP VISIT:   Allergies: No Known Allergies   Code Status: [facility]    Special Instructions:    Diet: Regular diet, Regular texture, Thin liquids consistency  Diagnosis: MULTIPLE SCLEROSIS history of anemia history of chronic venous ulcer with DVT  SUBJECTIVE:    68 patient had multiple sclerosis evaluated for pressure ulcer weight loss insomnia behavior problem    Advised dietitian wound care evaluations right and left lower leg wound cleaned with normal saline wound care evaluation    Insomnia trazodone melatonin    Behavior problem secondary to the MS psych evaluation    Chronic anemia H&H 11.5 and 38.2 with low MCH MCV HC 26 and 30.    Carbon dioxide 34.  CMP normal.  LDL 82.  Vitamin D normal.  B12 323.  Normal    Review chest x-ray no pneumonia or pleural effusion done on March 23, 2024 x-ray of the foot arthritis        REVIEW OF SYSTEMS:   All review of systems are negative unless otherwise stated above under subjective.    LABS REVIEWED AT FACILITY:    MEDICATIONS REVIEWED AT FACILITY:     Tylenol Tablet 325 MG (Acetaminophen) Active 7/5/2022 10/28/2020    Baclofen Tablet 20 MG Active 6/6/2023 6/6/2023  This order is potentially duplicated by other orders on the chart. Click to view details.    Ketoconazole Shampoo 2 % Active 8/9/2020 8/8/2020  This order is potentially duplicated by other orders on the chart. Click to view details.    Ketoconazole Cream 2 % Active 6/6/2023 6/6/2023    Flomax Capsule 0.4 MG (Tamsulosin HCl) Active 6/14/2023 6/14/2023    Protonix Tablet Delayed Release 40 MG (Pantoprazole Sodium) Active 6/30/2023 6/30/2023    Calcium Carbonate Tablet 1250 (500 Ca) MG Active 7/7/2022     Multivitamin-Minerals Tablet (Multiple Vitamins-Minerals) Active 7/7/2022     Aspirin EC Low Dose Tablet Delayed Release 81 MG (Aspirin) Active 12/18/2022     Lipitor Tablet 20 MG (Atorvastatin Calcium) Active 6/17/2023 6/17/2023    traZODone HCl Tablet 150  MG Active 6/29/2023 6/29/2023    Zoloft Oral Tablet 100 MG (Sertraline HCl) Active 6/18/2023 6/18/2023    LaMICtal Oral Tablet 100 MG (Lamotrigine) Active 7/5/2023 7/4/2023    Bactrim DS Oral Tablet 800-160 MG (Sulfamethoxazole-Trimethoprim) Active 7/22/2023 7/22/2023  Living will related issues reviewed-Code status:     OBJECTIVE:G35 MULTIPLE SCLEROSIS  8/1/2019    F33.1 MAJOR DEPRESSIVE DISORDER, RECURRENT, MODERATE  8/1/2019    F29 UNSP PSYCHOSIS NOT DUE TO A SUBSTANCE OR KNOWN PHYSIOL COND  8/1/2019    R45.851 SUICIDAL IDEATIONS  8/1/2019    G47.00 INSOMNIA, UNSPECIFIED  8/1/2019    D64.9 ANEMIA, UNSPECIFIED  8/1/2019    N31.9 NEUROMUSCULAR DYSFUNCTION OF BLADDER, UNSPECIFIED  10/20/2022    I87.323 CHRONIC VENOUS HTN W INFLAMMATION OF BILATERAL LOW EXTRM  10/20/2022    I87.313 CHRONIC VENOUS HYPERTENSION W ULCER OF BILATERAL LOW EXTRM  10/20/2022    I73.9 PERIPHERAL VASCULAR DISEASE, UNSPECIFIED  10/20/2022    L97.811 NON-PRS CHR ULCER OTH PRT R LOW LEG LIMITED TO BRKDWN SKIN  10/20/2022    L97.821 NON-PRS CHR ULCER OTH PRT L LOW LEG LIMITED TO BRKDWN SKIN  10/1/2022    F03.918 UNSP DEMENTIA, UNSP SEVERITY, WITH OTHER BEHAVIORAL DISTURB  4/6/2022    I87.312 CHRONIC VENOUS HYPERTENSION W ULCER OF L LOW EXTREM  4/6/2022    I87.311 CHRONIC VENOUS HYPERTENSION W ULCER OF R LOW EXTREM  2/24/2022    I87.2 VENOUS INSUFFICIENCY (CHRONIC) (PERIPHERAL  2/1/2022    R29.3 ABNORMAL POSTURE  12/10/2021    F22 DELUSIONAL DISORDERS  9/14/2021    M62.81 MUSCLE WEAKNESS (GENERALIZED  3/1/2021    N40.0 BENIGN PROSTATIC HYPERPLASIA WITHOUT LOWER URINRY TRACT SYMP  1/1/2021    Z86.16 PERSONAL HISTORY OF COVID-19  6/16/2020    Z87.440 PERSONAL HISTORY OF URINARY (TRACT) INFECTIONS  6/16/2020    R39.81 FUNCTIONAL URINARY INCONTINENCE  12/14/2019    K21.9 GASTRO-ESOPHAGEAL REFLUX DISEASE WITHOUT ESOPHAGITIS  8/1/2019    E78.5 HYPERLIPIDEMIA, UNSPECIFIED  8/1/2019    F10.21 ALCOHOL DEPENDENCE, IN REMISSION  There were no vitals taken  for this visit.  Physical Exam  Current Vitals   BP: 127/62 mmHg  2/23/2024 22:31  Temp:97.2 °F  2/23/2024 22:31  Pulse:70 bpm  2/23/2024 22:31  Weight:210.9 Lbs  3/8/2024 07:16  Resp:18 Breaths/min  2/23/2024 22:31  BS:  O2:94 %  2/23/2024 22:31  Pain:0  3/17/2024 10:47    General: Lethargic HEENT: PERRLA. EOMI. MMM. Nares patent bl.  Cardiovascular: Heart murmur  Respiratory: Rhonchi  GI: Soft, NT abdomen. BS present x 4.   : No CVAT BL  MSK: ROM x 4. CTLS non-tender.   Extremities: No edema. Cap refill < 2 sec.   Skin: Stage II pressure ulcer lower extremity chronic venous ulcer  Neuro: Myopathy neuropathy  Psych: Depression  Assessment/Plan   Advanced multiple sclerosis with anxiety depression  Anemia with gastritis  Chronic nonhealing wound lower extremity  PAD CAD  Insomnia  Gastritis  Dry skin  Mild dehydration  Neurogenic bladder bowel dysfunction    Recommendation plan as follow    Skin care bladder care bowel GI DVT prophylaxis  Wound care evaluation nutrition evaluation  Insomnia melatonin trazodone  Behavior problem Lamictal  Depression Zoloft PHQ twice a year  Hyperlipidemia on Lipitor 20 mg a day monitor LFT lipid CPK once a year  BPH Flomax Proscar PSA twice a year  Muscle spasm baclofen acetaminophen  Protein calorie malnutrition dietitian evaluation  DNR CC no hospitalization  Symptom management  Problem List Items Addressed This Visit       Multiple sclerosis (CMS/HCC)    Hyperlipemia, mixed     Will monitor LFT lipid CPK once a year         Venous stasis ulcer of lower extremity (CMS/HCC)       Phlebitis history of DVT venous ulcers advised vascular evaluation for wound and anticoagulation          Cauda equina syndrome with neurogenic bladder (CMS/HCC) - Primary       Secondary to multiple sclerosis advised follow-up with the neurologist urologist         Iron deficiency anemia due to chronic blood loss       68-year-old patient    EGD colonoscopy refer to Dr. Che for anemia         Chronic  insomnia    Benign prostatic hyperplasia without lower urinary tract symptoms   This patient was seen for my regular monthly visit, nursing evaluations and nursing notes were reviewed, interim events are reviewed, interim concerns and messages were reviewed as we have communicated with nursing staff.  Any issues with the falls, skin care impairment, declining physical condition are reviewed and noted, diagnosis list were reviewed, list of medications were reviewed, living will related issues were reviewed, overall patient has been doing well, any declining in patient's condition or any change in patient's condition needs to be notified to physician promptly, discussed with nursing staff, if needed would communicate with family.  Patient stays confined here at the facility for long-term care, there are always concerns about long-term care related issues and concerns.  Nursing staff is trying their best to keep patient safe, all sort of measures has been taken to keep patient safe and comfortable.   Skin integrity:  Nursing to monitor skin integrity as patient is at risk for pressure injuries.  Wound care per nursing    See Facility notes for measurements/assessments  Turn and reposition Q 2 hours or more  Air mattress and when up in chair cushion reducing device  Dietician to evaluate and recommend:  Nutritional supplement:  Please monitor skin integrity and other pressure areas  Referral to wound clinic if appropriate:    PLAN:  Pt has been seen for follow up visit.  The patient is here at facility for PT/OT/ST to maximize strength, function, endurance and safety.  The patient is participating in therapy. Arnoldo Pearl is making progress to the best of his/her ability.   Please see PT/OT/ST notes in the facility for detailed information regarding progression of patients progress.   Recent nursing evaluation and notes were reviewed.   Overall, patient is stable despite his/her chronic conditions.    #  Any decline or  change in condition needs to be communicated with the physician or myself.    Discussion with nursing staff regarding ongoing care and management.  If needed, would communicate with family who are not present at this time.   There are no concerns at this time.  We will continue with the medications noted above.    We will continue to follow the patient here at the facility.    Discharge planning:   Discharge Home when goals are met.   Follow up with PCP in 1-2 weeks after discharge from facility.   C to follow after discharge for continued PT/OT and Nursing.    *Please note that nursing facility, outside laboratory agency, and  [facility] do not interface.     Completion of the note was done through Dragon voice recognition technology and may include   unintended or grammatical errors which may not have been recognized when finalizing the note.     Time:    Dennys Dean MD

## 2024-03-17 NOTE — LETTER
Patient: Arnoldo Pearl  : 1955    Encounter Date: 2024    Name: Arnoldo Pearl  YOB: 1955    FOLLOW UP VISIT:   Allergies: No Known Allergies   Code Status: [facility]    Special Instructions:    Diet: Regular diet, Regular texture, Thin liquids consistency  Diagnosis: MULTIPLE SCLEROSIS history of anemia history of chronic venous ulcer with DVT  SUBJECTIVE:    68 patient had multiple sclerosis evaluated for pressure ulcer weight loss insomnia behavior problem    Advised dietitian wound care evaluations right and left lower leg wound cleaned with normal saline wound care evaluation    Insomnia trazodone melatonin    Behavior problem secondary to the MS psych evaluation    Chronic anemia H&H 11.5 and 38.2 with low MCH MCV HC 26 and 30.    Carbon dioxide 34.  CMP normal.  LDL 82.  Vitamin D normal.  B12 323.  Normal    Review chest x-ray no pneumonia or pleural effusion done on 2024 x-ray of the foot arthritis        REVIEW OF SYSTEMS:   All review of systems are negative unless otherwise stated above under subjective.    LABS REVIEWED AT FACILITY:    MEDICATIONS REVIEWED AT FACILITY:     Tylenol Tablet 325 MG (Acetaminophen) Active 2022 10/28/2020    Baclofen Tablet 20 MG Active 2023  This order is potentially duplicated by other orders on the chart. Click to view details.    Ketoconazole Shampoo 2 % Active 2020  This order is potentially duplicated by other orders on the chart. Click to view details.    Ketoconazole Cream 2 % Active 2023    Flomax Capsule 0.4 MG (Tamsulosin HCl) Active 2023    Protonix Tablet Delayed Release 40 MG (Pantoprazole Sodium) Active 2023    Calcium Carbonate Tablet 1250 (500 Ca) MG Active 2022     Multivitamin-Minerals Tablet (Multiple Vitamins-Minerals) Active 2022     Aspirin EC Low Dose Tablet Delayed Release 81 MG (Aspirin) Active 2022     Lipitor Tablet 20 MG (Atorvastatin  Calcium) Active 6/17/2023 6/17/2023    traZODone HCl Tablet 150 MG Active 6/29/2023 6/29/2023    Zoloft Oral Tablet 100 MG (Sertraline HCl) Active 6/18/2023 6/18/2023    LaMICtal Oral Tablet 100 MG (Lamotrigine) Active 7/5/2023 7/4/2023    Bactrim DS Oral Tablet 800-160 MG (Sulfamethoxazole-Trimethoprim) Active 7/22/2023 7/22/2023  Living will related issues reviewed-Code status:     OBJECTIVE:G35 MULTIPLE SCLEROSIS  8/1/2019    F33.1 MAJOR DEPRESSIVE DISORDER, RECURRENT, MODERATE  8/1/2019    F29 UNSP PSYCHOSIS NOT DUE TO A SUBSTANCE OR KNOWN PHYSIOL COND  8/1/2019    R45.851 SUICIDAL IDEATIONS  8/1/2019    G47.00 INSOMNIA, UNSPECIFIED  8/1/2019    D64.9 ANEMIA, UNSPECIFIED  8/1/2019    N31.9 NEUROMUSCULAR DYSFUNCTION OF BLADDER, UNSPECIFIED  10/20/2022    I87.323 CHRONIC VENOUS HTN W INFLAMMATION OF BILATERAL LOW EXTRM  10/20/2022    I87.313 CHRONIC VENOUS HYPERTENSION W ULCER OF BILATERAL LOW EXTRM  10/20/2022    I73.9 PERIPHERAL VASCULAR DISEASE, UNSPECIFIED  10/20/2022    L97.811 NON-PRS CHR ULCER OTH PRT R LOW LEG LIMITED TO BRKDWN SKIN  10/20/2022    L97.821 NON-PRS CHR ULCER OTH PRT L LOW LEG LIMITED TO BRKDWN SKIN  10/1/2022    F03.918 UNSP DEMENTIA, UNSP SEVERITY, WITH OTHER BEHAVIORAL DISTURB  4/6/2022    I87.312 CHRONIC VENOUS HYPERTENSION W ULCER OF L LOW EXTREM  4/6/2022    I87.311 CHRONIC VENOUS HYPERTENSION W ULCER OF R LOW EXTREM  2/24/2022    I87.2 VENOUS INSUFFICIENCY (CHRONIC) (PERIPHERAL  2/1/2022    R29.3 ABNORMAL POSTURE  12/10/2021    F22 DELUSIONAL DISORDERS  9/14/2021    M62.81 MUSCLE WEAKNESS (GENERALIZED  3/1/2021    N40.0 BENIGN PROSTATIC HYPERPLASIA WITHOUT LOWER URINRY TRACT SYMP  1/1/2021    Z86.16 PERSONAL HISTORY OF COVID-19  6/16/2020    Z87.440 PERSONAL HISTORY OF URINARY (TRACT) INFECTIONS  6/16/2020    R39.81 FUNCTIONAL URINARY INCONTINENCE  12/14/2019    K21.9 GASTRO-ESOPHAGEAL REFLUX DISEASE WITHOUT ESOPHAGITIS  8/1/2019    E78.5 HYPERLIPIDEMIA, UNSPECIFIED  8/1/2019     F10.21 ALCOHOL DEPENDENCE, IN REMISSION  There were no vitals taken for this visit.  Physical Exam  Current Vitals   BP: 127/62 mmHg  2/23/2024 22:31  Temp:97.2 °F  2/23/2024 22:31  Pulse:70 bpm  2/23/2024 22:31  Weight:210.9 Lbs  3/8/2024 07:16  Resp:18 Breaths/min  2/23/2024 22:31  BS:  O2:94 %  2/23/2024 22:31  Pain:0  3/17/2024 10:47    General: Lethargic HEENT: PERRLA. EOMI. MMM. Nares patent bl.  Cardiovascular: Heart murmur  Respiratory: Rhonchi  GI: Soft, NT abdomen. BS present x 4.   : No CVAT BL  MSK: ROM x 4. CTLS non-tender.   Extremities: No edema. Cap refill < 2 sec.   Skin: Stage II pressure ulcer lower extremity chronic venous ulcer  Neuro: Myopathy neuropathy  Psych: Depression  Assessment/Plan  Advanced multiple sclerosis with anxiety depression  Anemia with gastritis  Chronic nonhealing wound lower extremity  PAD CAD  Insomnia  Gastritis  Dry skin  Mild dehydration  Neurogenic bladder bowel dysfunction    Recommendation plan as follow    Skin care bladder care bowel GI DVT prophylaxis  Wound care evaluation nutrition evaluation  Insomnia melatonin trazodone  Behavior problem Lamictal  Depression Zoloft PHQ twice a year  Hyperlipidemia on Lipitor 20 mg a day monitor LFT lipid CPK once a year  BPH Flomax Proscar PSA twice a year  Muscle spasm baclofen acetaminophen  Protein calorie malnutrition dietitian evaluation  DNR CC no hospitalization  Symptom management  Problem List Items Addressed This Visit       Multiple sclerosis (CMS/HCC)    Hyperlipemia, mixed     Will monitor LFT lipid CPK once a year         Venous stasis ulcer of lower extremity (CMS/HCC)       Phlebitis history of DVT venous ulcers advised vascular evaluation for wound and anticoagulation          Cauda equina syndrome with neurogenic bladder (CMS/HCC) - Primary       Secondary to multiple sclerosis advised follow-up with the neurologist urologist         Iron deficiency anemia due to chronic blood loss       68-year-old  patient    EGD colonoscopy refer to Dr. Che for anemia         Chronic insomnia    Benign prostatic hyperplasia without lower urinary tract symptoms   This patient was seen for my regular monthly visit, nursing evaluations and nursing notes were reviewed, interim events are reviewed, interim concerns and messages were reviewed as we have communicated with nursing staff.  Any issues with the falls, skin care impairment, declining physical condition are reviewed and noted, diagnosis list were reviewed, list of medications were reviewed, living will related issues were reviewed, overall patient has been doing well, any declining in patient's condition or any change in patient's condition needs to be notified to physician promptly, discussed with nursing staff, if needed would communicate with family.  Patient stays confined here at the facility for long-term care, there are always concerns about long-term care related issues and concerns.  Nursing staff is trying their best to keep patient safe, all sort of measures has been taken to keep patient safe and comfortable.   Skin integrity:  Nursing to monitor skin integrity as patient is at risk for pressure injuries.  Wound care per nursing    See Facility notes for measurements/assessments  Turn and reposition Q 2 hours or more  Air mattress and when up in chair cushion reducing device  Dietician to evaluate and recommend:  Nutritional supplement:  Please monitor skin integrity and other pressure areas  Referral to wound clinic if appropriate:    PLAN:  Pt has been seen for follow up visit.  The patient is here at facility for PT/OT/ST to maximize strength, function, endurance and safety.  The patient is participating in therapy. Arnoldo Pearl is making progress to the best of his/her ability.   Please see PT/OT/ST notes in the facility for detailed information regarding progression of patients progress.   Recent nursing evaluation and notes were reviewed.   Overall,  patient is stable despite his/her chronic conditions.    #  Any decline or change in condition needs to be communicated with the physician or myself.    Discussion with nursing staff regarding ongoing care and management.  If needed, would communicate with family who are not present at this time.   There are no concerns at this time.  We will continue with the medications noted above.    We will continue to follow the patient here at the facility.    Discharge planning:   Discharge Home when goals are met.   Follow up with PCP in 1-2 weeks after discharge from facility.   [facility] to follow after discharge for continued PT/OT and Nursing.    *Please note that nursing facility, outside laboratory agency, and  [facility] do not interface.     Completion of the note was done through Dragon voice recognition technology and may include   unintended or grammatical errors which may not have been recognized when finalizing the note.     Time:    Dennys Dean MD         Electronically Signed By: Dennys Dean MD   3/17/24  2:18 PM

## 2024-03-17 NOTE — ASSESSMENT & PLAN NOTE
Phlebitis history of DVT venous ulcers advised vascular evaluation for wound and anticoagulation

## 2024-04-04 ENCOUNTER — TELEPHONE (OUTPATIENT)
Dept: PRIMARY CARE | Facility: CLINIC | Age: 69
End: 2024-04-04

## 2024-05-19 ENCOUNTER — NURSING HOME VISIT (OUTPATIENT)
Dept: POST ACUTE CARE | Facility: EXTERNAL LOCATION | Age: 69
End: 2024-05-19
Payer: COMMERCIAL

## 2024-05-19 DIAGNOSIS — F51.04 CHRONIC INSOMNIA: ICD-10-CM

## 2024-05-19 DIAGNOSIS — F10.27: ICD-10-CM

## 2024-05-19 DIAGNOSIS — Z00.00 MEDICARE ANNUAL WELLNESS VISIT, SUBSEQUENT: Primary | ICD-10-CM

## 2024-05-19 DIAGNOSIS — G83.4 CAUDA EQUINA SYNDROME WITH NEUROGENIC BLADDER (MULTI): ICD-10-CM

## 2024-05-19 DIAGNOSIS — R35.0 BENIGN PROSTATIC HYPERPLASIA WITH URINARY FREQUENCY: ICD-10-CM

## 2024-05-19 DIAGNOSIS — E78.2 HYPERLIPEMIA, MIXED: ICD-10-CM

## 2024-05-19 DIAGNOSIS — I10 BENIGN ESSENTIAL HYPERTENSION: ICD-10-CM

## 2024-05-19 DIAGNOSIS — G35 MULTIPLE SCLEROSIS (MULTI): ICD-10-CM

## 2024-05-19 DIAGNOSIS — N40.1 BENIGN PROSTATIC HYPERPLASIA WITH URINARY FREQUENCY: ICD-10-CM

## 2024-05-19 DIAGNOSIS — K21.9 GERD WITHOUT ESOPHAGITIS: ICD-10-CM

## 2024-05-19 DIAGNOSIS — I83.029 VENOUS STASIS ULCER OF LEFT LOWER EXTREMITY (MULTI): ICD-10-CM

## 2024-05-19 DIAGNOSIS — L97.929 VENOUS STASIS ULCER OF LEFT LOWER EXTREMITY (MULTI): ICD-10-CM

## 2024-05-19 PROCEDURE — 99497 ADVNCD CARE PLAN 30 MIN: CPT | Performed by: INTERNAL MEDICINE

## 2024-05-19 PROCEDURE — G0439 PPPS, SUBSEQ VISIT: HCPCS | Performed by: INTERNAL MEDICINE

## 2024-05-19 ASSESSMENT — PATIENT HEALTH QUESTIONNAIRE - PHQ9
2. FEELING DOWN, DEPRESSED OR HOPELESS: SEVERAL DAYS
SUM OF ALL RESPONSES TO PHQ9 QUESTIONS 1 AND 2: 2
10. IF YOU CHECKED OFF ANY PROBLEMS, HOW DIFFICULT HAVE THESE PROBLEMS MADE IT FOR YOU TO DO YOUR WORK, TAKE CARE OF THINGS AT HOME, OR GET ALONG WITH OTHER PEOPLE: VERY DIFFICULT
1. LITTLE INTEREST OR PLEASURE IN DOING THINGS: SEVERAL DAYS

## 2024-05-19 ASSESSMENT — ACTIVITIES OF DAILY LIVING (ADL)
MANAGING_FINANCES: TOTAL CARE
TAKING_MEDICATION: TOTAL CARE
GROCERY_SHOPPING: TOTAL CARE
DRESSING: NEEDS ASSISTANCE
BATHING: NEEDS ASSISTANCE
DOING_HOUSEWORK: TOTAL CARE

## 2024-05-19 NOTE — LETTER
Patient: Arnoldo Pearl  : 1955    Encounter Date: 2024    Assessment and Plan:  Problem List Items Addressed This Visit       Multiple sclerosis (Multi)    GERD without esophagitis    Benign essential hypertension     Patients BP readings reviewed and addressed, as we age our arteries turn stiffer and less elastic. Restricting salt consumption and staying physically fit with regular exercise regimen is the only way to keep our vasculature less tonic. Studies have shown that keeping ideal body wt, exercise routine about 140 to 150 minutes a week, eating variety of plant based diet and drinking plentiful water are quite helpful. Monitor BP twice or once a week at home and bring log to be reviewed by me. Uncontrolled BP has long term consequences including heart failure, myocardial infarction, accelerated atherosclerosis and kidney dysfunction. Therapy reviewed and explained.           Hyperlipemia, mixed    Benign prostatic hyperplasia with urinary frequency    Moderate dementia associated with alcoholism, with agitation (Multi)     Dementia is chronic neurodegenerative disorder where there is not cure and it can bring substantial burden to pt and family and caregivers.There are choices available for treatment of dementia and careful monitoring and follow up by us can help to reduce caregiver burden and keep pt homebound along with family members Dementia does not have sure and it can bring morbidity and mortality eventually. Mental or mind exercises, mindfulness, keeping active and cheerful, proper and well balanced diet and water consumption and brisk walks can be beneficial. Lots of studies are ongoing for dementia treatment. Forgetfulness, agitation, stubbornness, depression, insomnia, wandering are not uncommon. There are organizations , societies and support groups available for dementia related disorders.          Venous stasis ulcer of lower extremity (Multi)    Cauda equina syndrome with neurogenic  bladder (Multi)    Chronic insomnia    Medicare annual wellness visit, subsequent - Primary     Voluntary discuss with patient about Advance care planning DO NOT RESUSCITATE I  spent more than 18 minutes discussing advanced Planning including an explanation and discussion of advanced directives discussed about a living will and power of  patient was encouraged to work on completing this documentation options are1= DO NOT RESUSCITATE CC2=, DO NOT RESUSCITATE  at arrest,3= full code documented in the chart.    I spent 15 minutes obtaining and discussing depression screening using pHq-2 questions with patient documented in the chart treatment plan discussI spent 15 minutes face-to-face    Chief Complaint:   Annual Medicare Wellness ecf Exam/Comprehensive Problem Focused Follow Up and Physical Exam      HPI:68-year-old patient have multiple sclerosis anxiety depression psychosis insomnia anemia bladder bowel dysfunction chronic venous stasis lower extremity with the PAD prostatitis history of the alcohol abuse    Arthritis acetaminophen muscle spasm baclofen    BPH Flomax    PAD aspirin    Hyperlipidemia Lipitor    Depression    Behavior problem Lamictal trazodone    Mild COPD continue inhaler albuterol flu pneumonia COVID-19 vaccines    H&H 11.5 and 38.2 anemia    Carbon dioxide 34 CBC CMP vitamin D B12 was normal    Review x-ray    Chest x-ray negative    Duplex scan negative    X-ray of the foot arthritis        Patient Active Problem List:  Patient Active Problem List   Diagnosis   • Multiple sclerosis (Multi)   • GERD without esophagitis   • Moderate dementia with agitation (Multi)   • Benign essential hypertension   • Hyperlipemia, mixed   • Pressure ulcer of coccygeal region, stage 2 (Multi)   • Benign prostatic hyperplasia with urinary frequency   • Essential (hemorrhagic) thrombocythemia (Multi)   • Moderate dementia associated with alcoholism, with agitation (Multi)   • Venous stasis ulcer of lower  extremity (Multi)   • Cauda equina syndrome with neurogenic bladder (Multi)   • Iron deficiency anemia due to chronic blood loss   • Chronic insomnia   • Benign prostatic hyperplasia without lower urinary tract symptoms   • Medicare annual wellness visit, subsequent          Comprehensive Medical/Surgical/Social/Family History:  No family history on file.    History reviewed. No pertinent past medical history.    History reviewed. No pertinent surgical history.    Social History     Socioeconomic History   • Marital status: Single     Spouse name: None   • Number of children: None   • Years of education: None   • Highest education level: None   Occupational History   • None   Tobacco Use   • Smoking status: Former     Types: Cigarettes   • Smokeless tobacco: Never   Substance and Sexual Activity   • Alcohol use: Not Currently   • Drug use: Not Currently   • Sexual activity: None   Other Topics Concern   • None   Social History Narrative   • None     Social Determinants of Health     Financial Resource Strain: Not on file   Food Insecurity: Not on file   Transportation Needs: Not on file   Physical Activity: Not on file   Stress: Not on file   Social Connections: Not on file   Intimate Partner Violence: Not on file   Housing Stability: Not on file       Tobacco/Alcohol/Opioid use, as well as Illicit Drug Use was screened for/reviewed and documented in Social Documentation section of the chart and medication list as appropriate    Allergies and Medications  Not on File  No current outpatient medications on file.     No current facility-administered medications for this visit.       Medications and Supplements  prescribed by me and other practitioners or clinical pharmacist (such as prescriptions, OTC's, herbal therapies and supplements) were reviewed and documented in the medical record.     Advance directives  Advanced Care Planning (including a Living Will, Healthcare POA, as well as specific end of life choices  and/or directives), was discussed for approximately 16 minutes with the patient and/or surrogate, voluntarily, and documented in the Problem List of the medical record.     Cardiac Risk Assessment  Cardiovascular risk was discussed and, if needed, lifestyle modifications recommended, including nutritional choices, exercise, and elimination of habits contributing to risk. We agreed on a plan to reduce the current cardiovascular risk based on above discussion as needed.  Aspirin use/disuse was discussed and documented in the Problem List of the medical record after reviewing the updated guidelines below:    Consider low dose Aspirin ( mg) use if the benefit for cardiovascular disease prevention outweighs risk for bleeding complications.   In general, low dose ASA should be considered:  In patients WITHOUT prior MI/stroke/PAD (primary prevention):   a. Age <60: Use if 10-year cardiovascular disease risk >20%, with discussion of risks and benefits with patient  b. Age 60-<70: Use if 10-year cardiovascular disease risk >20% and low bleeding (e.g., gastrointenstinal) risk, with discussion of risks and benefits with patient  c. Age >=70: Do not use    In patients WITH prior MI/stroke/PAD (secondary prevention):   Generally use unless extremely high bleeding (e.g., gastrointenstinal) risk, with discussion of risks and benefits with patient    ROS otherwise negative aside from what was mentioned above in HPI.    Visit Vitals  Smoking Status Former       Physical Exam  Current Vitals   BP: 128/80 mmHg  5/6/2024 04:10  Temp:97.7 °F  5/6/2024 04:10  Pulse:70 bpm  5/6/2024 04:10    Weight:228.2 Lbs  5/2/2024 18:50  Resp:22 Breaths/min  5/6/2024 04:10  BS:  O2:92 %  5/6/2024 04:10  Pain:0  5/19/2024 08:38  Physical Exam:    Appearance: Alert, oriented , cooperative,  in no acute distress. Well nourished & well hydrated.    Skin: Intact,  dry skin, no lesions, rash, petechiae or purpura.     Eyes: PERRLA, EOMs intact,   Conjunctiva pink with no redness or exudates. Cornea & anterior chamber are clear, Eyelids without lesions. No scleral icterus.     ENT: Minimal wax    Neck: JVD plus.    Pulmonary: Bilateral rhonchi.    Cardiac: Systolic heart murmur    Abdomen: Bowel sounds heard nontender.    Genitourinary: Exam deferred.    Musculoskeletal: Muscle spasm.    Neurological: Neurology myalgia    Psychiatric: Depression without suicide        During the course of the visit the patient was educated and counseled about age appropriate screening and preventive services. Completed preventive screenings were documented in the chart and orders were placed for outstanding screenings/procedures as documented in the Assessment and Plan.    Patient Instructions (the written plan) was given to the patient at check out.    Charting was completed using voice recognition technology and may include unintended errors.    Electronically Signed By: Dennys Dean MD   5/19/24 12:55 PM

## 2024-05-19 NOTE — PROGRESS NOTES
Assessment and Plan:  Problem List Items Addressed This Visit       Multiple sclerosis (Multi)    GERD without esophagitis    Benign essential hypertension     Patients BP readings reviewed and addressed, as we age our arteries turn stiffer and less elastic. Restricting salt consumption and staying physically fit with regular exercise regimen is the only way to keep our vasculature less tonic. Studies have shown that keeping ideal body wt, exercise routine about 140 to 150 minutes a week, eating variety of plant based diet and drinking plentiful water are quite helpful. Monitor BP twice or once a week at home and bring log to be reviewed by me. Uncontrolled BP has long term consequences including heart failure, myocardial infarction, accelerated atherosclerosis and kidney dysfunction. Therapy reviewed and explained.           Hyperlipemia, mixed    Benign prostatic hyperplasia with urinary frequency    Moderate dementia associated with alcoholism, with agitation (Multi)     Dementia is chronic neurodegenerative disorder where there is not cure and it can bring substantial burden to pt and family and caregivers.There are choices available for treatment of dementia and careful monitoring and follow up by us can help to reduce caregiver burden and keep pt homebound along with family members Dementia does not have sure and it can bring morbidity and mortality eventually. Mental or mind exercises, mindfulness, keeping active and cheerful, proper and well balanced diet and water consumption and brisk walks can be beneficial. Lots of studies are ongoing for dementia treatment. Forgetfulness, agitation, stubbornness, depression, insomnia, wandering are not uncommon. There are organizations , societies and support groups available for dementia related disorders.          Venous stasis ulcer of lower extremity (Multi)    Cauda equina syndrome with neurogenic bladder (Multi)    Chronic insomnia    Medicare annual wellness  visit, subsequent - Primary     Voluntary discuss with patient about Advance care planning DO NOT RESUSCITATE I  spent more than 18 minutes discussing advanced Planning including an explanation and discussion of advanced directives discussed about a living will and power of  patient was encouraged to work on completing this documentation options are1= DO NOT RESUSCITATE CC2=, DO NOT RESUSCITATE  at arrest,3= full code documented in the chart.    I spent 15 minutes obtaining and discussing depression screening using pHq-2 questions with patient documented in the chart treatment plan discussI spent 15 minutes face-to-face    Chief Complaint:   Annual Medicare Wellness ecf Exam/Comprehensive Problem Focused Follow Up and Physical Exam      HPI:68-year-old patient have multiple sclerosis anxiety depression psychosis insomnia anemia bladder bowel dysfunction chronic venous stasis lower extremity with the PAD prostatitis history of the alcohol abuse    Arthritis acetaminophen muscle spasm baclofen    BPH Flomax    PAD aspirin    Hyperlipidemia Lipitor    Depression    Behavior problem Lamictal trazodone    Mild COPD continue inhaler albuterol flu pneumonia COVID-19 vaccines    H&H 11.5 and 38.2 anemia    Carbon dioxide 34 CBC CMP vitamin D B12 was normal    Review x-ray    Chest x-ray negative    Duplex scan negative    X-ray of the foot arthritis        Patient Active Problem List:  Patient Active Problem List   Diagnosis    Multiple sclerosis (Multi)    GERD without esophagitis    Moderate dementia with agitation (Multi)    Benign essential hypertension    Hyperlipemia, mixed    Pressure ulcer of coccygeal region, stage 2 (Multi)    Benign prostatic hyperplasia with urinary frequency    Essential (hemorrhagic) thrombocythemia (Multi)    Moderate dementia associated with alcoholism, with agitation (Multi)    Venous stasis ulcer of lower extremity (Multi)    Cauda equina syndrome with neurogenic bladder (Multi)     Iron deficiency anemia due to chronic blood loss    Chronic insomnia    Benign prostatic hyperplasia without lower urinary tract symptoms    Medicare annual wellness visit, subsequent          Comprehensive Medical/Surgical/Social/Family History:  No family history on file.    History reviewed. No pertinent past medical history.    History reviewed. No pertinent surgical history.    Social History     Socioeconomic History    Marital status: Single     Spouse name: None    Number of children: None    Years of education: None    Highest education level: None   Occupational History    None   Tobacco Use    Smoking status: Former     Types: Cigarettes    Smokeless tobacco: Never   Substance and Sexual Activity    Alcohol use: Not Currently    Drug use: Not Currently    Sexual activity: None   Other Topics Concern    None   Social History Narrative    None     Social Determinants of Health     Financial Resource Strain: Not on file   Food Insecurity: Not on file   Transportation Needs: Not on file   Physical Activity: Not on file   Stress: Not on file   Social Connections: Not on file   Intimate Partner Violence: Not on file   Housing Stability: Not on file       Tobacco/Alcohol/Opioid use, as well as Illicit Drug Use was screened for/reviewed and documented in Social Documentation section of the chart and medication list as appropriate    Allergies and Medications  Not on File  No current outpatient medications on file.     No current facility-administered medications for this visit.       Medications and Supplements  prescribed by me and other practitioners or clinical pharmacist (such as prescriptions, OTC's, herbal therapies and supplements) were reviewed and documented in the medical record.     Advance directives  Advanced Care Planning (including a Living Will, Healthcare POA, as well as specific end of life choices and/or directives), was discussed for approximately 16 minutes with the patient and/or surrogate,  voluntarily, and documented in the Problem List of the medical record.     Cardiac Risk Assessment  Cardiovascular risk was discussed and, if needed, lifestyle modifications recommended, including nutritional choices, exercise, and elimination of habits contributing to risk. We agreed on a plan to reduce the current cardiovascular risk based on above discussion as needed.  Aspirin use/disuse was discussed and documented in the Problem List of the medical record after reviewing the updated guidelines below:    Consider low dose Aspirin ( mg) use if the benefit for cardiovascular disease prevention outweighs risk for bleeding complications.   In general, low dose ASA should be considered:  In patients WITHOUT prior MI/stroke/PAD (primary prevention):   a. Age <60: Use if 10-year cardiovascular disease risk >20%, with discussion of risks and benefits with patient  b. Age 60-<70: Use if 10-year cardiovascular disease risk >20% and low bleeding (e.g., gastrointenstinal) risk, with discussion of risks and benefits with patient  c. Age >=70: Do not use    In patients WITH prior MI/stroke/PAD (secondary prevention):   Generally use unless extremely high bleeding (e.g., gastrointenstinal) risk, with discussion of risks and benefits with patient    ROS otherwise negative aside from what was mentioned above in HPI.    Visit Vitals  Smoking Status Former       Physical Exam  Current Vitals   BP: 128/80 mmHg  5/6/2024 04:10  Temp:97.7 °F  5/6/2024 04:10  Pulse:70 bpm  5/6/2024 04:10    Weight:228.2 Lbs  5/2/2024 18:50  Resp:22 Breaths/min  5/6/2024 04:10  BS:  O2:92 %  5/6/2024 04:10  Pain:0  5/19/2024 08:38  Physical Exam:    Appearance: Alert, oriented , cooperative,  in no acute distress. Well nourished & well hydrated.    Skin: Intact,  dry skin, no lesions, rash, petechiae or purpura.     Eyes: PERRLA, EOMs intact,  Conjunctiva pink with no redness or exudates. Cornea & anterior chamber are clear, Eyelids without  lesions. No scleral icterus.     ENT: Minimal wax    Neck: JVD plus.    Pulmonary: Bilateral rhonchi.    Cardiac: Systolic heart murmur    Abdomen: Bowel sounds heard nontender.    Genitourinary: Exam deferred.    Musculoskeletal: Muscle spasm.    Neurological: Neurology myalgia    Psychiatric: Depression without suicide        During the course of the visit the patient was educated and counseled about age appropriate screening and preventive services. Completed preventive screenings were documented in the chart and orders were placed for outstanding screenings/procedures as documented in the Assessment and Plan.    Patient Instructions (the written plan) was given to the patient at check out.    Charting was completed using voice recognition technology and may include unintended errors.